# Patient Record
Sex: MALE | Race: BLACK OR AFRICAN AMERICAN | Employment: FULL TIME | URBAN - METROPOLITAN AREA
[De-identification: names, ages, dates, MRNs, and addresses within clinical notes are randomized per-mention and may not be internally consistent; named-entity substitution may affect disease eponyms.]

---

## 2020-04-26 ENCOUNTER — HOSPITAL ENCOUNTER (EMERGENCY)
Age: 45
Discharge: HOME | End: 2020-04-26
Attending: EMERGENCY MEDICINE | Admitting: EMERGENCY MEDICINE
Payer: COMMERCIAL

## 2020-04-26 ENCOUNTER — APPOINTMENT (OUTPATIENT)
Dept: GENERAL RADIOLOGY | Age: 45
End: 2020-04-26
Attending: EMERGENCY MEDICINE
Payer: COMMERCIAL

## 2020-04-26 VITALS
HEART RATE: 91 BPM | RESPIRATION RATE: 16 BRPM | BODY MASS INDEX: 33.18 KG/M2 | WEIGHT: 237 LBS | OXYGEN SATURATION: 97 % | DIASTOLIC BLOOD PRESSURE: 116 MMHG | SYSTOLIC BLOOD PRESSURE: 216 MMHG | TEMPERATURE: 99.7 F | HEIGHT: 71 IN

## 2020-04-26 DIAGNOSIS — M25.561 ACUTE PAIN OF RIGHT KNEE: Primary | ICD-10-CM

## 2020-04-26 PROCEDURE — 99282 EMERGENCY DEPT VISIT SF MDM: CPT

## 2020-04-26 PROCEDURE — 73564 X-RAY EXAM KNEE 4 OR MORE: CPT

## 2020-04-26 RX ORDER — NAPROXEN 500 MG/1
500 TABLET ORAL 2 TIMES DAILY WITH MEALS
Qty: 20 TAB | Refills: 0 | Status: SHIPPED | OUTPATIENT
Start: 2020-04-26 | End: 2020-11-10

## 2020-04-26 RX ORDER — KETOROLAC TROMETHAMINE 15 MG/ML
15 INJECTION, SOLUTION INTRAMUSCULAR; INTRAVENOUS
Status: DISCONTINUED | OUTPATIENT
Start: 2020-04-26 | End: 2020-04-26 | Stop reason: HOSPADM

## 2020-04-26 NOTE — DISCHARGE INSTRUCTIONS
Patient Education        Joint Pain: Care Instructions  Your Care Instructions    Many people have small aches and pains from overuse or injury to muscles and joints. Joint injuries often happen during sports or recreation, work tasks, or projects around the home. An overuse injury can happen when you put too much stress on a joint or when you do an activity that stresses the joint over and over, such as using the computer or rowing a boat. You can take action at home to help your muscles and joints get better. You should feel better in 1 to 2 weeks, but it can take 3 months or more to heal completely. Follow-up care is a key part of your treatment and safety. Be sure to make and go to all appointments, and call your doctor if you are having problems. It's also a good idea to know your test results and keep a list of the medicines you take. How can you care for yourself at home? · Do not put weight on the injured joint for at least a day or two. · For the first day or two after an injury, do not take hot showers or baths, and do not use hot packs. The heat could make swelling worse. · Put ice or a cold pack on the sore joint for 10 to 20 minutes at a time. Try to do this every 1 to 2 hours for the next 3 days (when you are awake) or until the swelling goes down. Put a thin cloth between the ice and your skin. · Wrap the injury in an elastic bandage. Do not wrap it too tightly because this can cause more swelling. · Prop up the sore joint on a pillow when you ice it or anytime you sit or lie down during the next 3 days. Try to keep it above the level of your heart. This will help reduce swelling. · Take an over-the-counter pain medicine, such as acetaminophen (Tylenol), ibuprofen (Advil, Motrin), or naproxen (Aleve). Read and follow all instructions on the label. · After 1 or 2 days of rest, begin moving the joint gently.  While the joint is still healing, you can begin to exercise using activities that do not strain or hurt the painful joint. When should you call for help? Call your doctor now or seek immediate medical care if:    · You have signs of infection, such as:  ? Increased pain, swelling, warmth, and redness. ? Red streaks leading from the joint. ? A fever.    Watch closely for changes in your health, and be sure to contact your doctor if:    · Your movement or symptoms are not getting better after 1 to 2 weeks of home treatment. Where can you learn more? Go to http://wm-nisha.info/  Enter P205 in the search box to learn more about \"Joint Pain: Care Instructions. \"  Current as of: June 26, 2019Content Version: 12.4  © 7194-9872 [employer]. Care instructions adapted under license by [employer] (which disclaims liability or warranty for this information). If you have questions about a medical condition or this instruction, always ask your healthcare professional. [place] [address] any warranty or liability for your use of this information. Patient Education        Learning About Joint Injections  What are joint injections? Joint injections are shots into a joint, such as the knee or shoulder. They are used to put in medicines, such as pain relievers and steroid medicines. Steroids can be injected directly into a swollen and painful joint to reduce inflammation. A steroid shot can sometimes help with short-term pain relief when other treatments haven't worked. If steroid shots help, pain may improve for weeks or months. How are they done? First, the area over the joint will be cleaned. Your doctor may then use a tiny needle to numb the skin in the area where you will get the joint injection. If a tiny needle is used to numb the area, your doctor will use another needle to inject the medicine. Your doctor may use a pain reliever, a steroid, or both. You may feel some pressure or discomfort.   The procedure takes 10 to 30 minutes. But the injection itself usually takes only a few minutes. Your doctor may put ice on the area before you go home. You will probably go home soon after your shot. What can you expect after a joint injection? You may have numbness around the joint for a few hours. If your shot included both a pain reliever and a steroid, then the pain will probably go away right away. But it might come back after a few hours. This might happen if the pain reliever wears off and the steroid hasn't started to work yet. Steroids don't always work. But when they do, the pain relief can last for several days to a few months or longer. Your doctor may tell you to use ice on the area. You can also use ice if the pain comes back. Put ice or a cold pack on your joint for 10 to 20 minutes at a time. Put a thin cloth between the ice and your skin. Follow your doctor's instructions carefully. Follow-up care is a key part of your treatment and safety. Be sure to make and go to all appointments, and call your doctor if you are having problems. It's also a good idea to know your test results and keep a list of the medicines you take. Where can you learn more? Go to http://wm-nisha.info/  Enter N961 in the search box to learn more about \"Learning About Joint Injections. \"  Current as of: June 26, 2019Content Version: 12.4  © 0362-8437 [employer]. Care instructions adapted under license by [employer] (which disclaims liability or warranty for this information). If you have questions about a medical condition or this instruction, always ask your healthcare professional. [address] any warranty or liability for your use of this information.

## 2020-04-26 NOTE — ED PROVIDER NOTES
EMERGENCY DEPARTMENT HISTORY AND PHYSICAL EXAM    5:18 PM      Date: 4/26/2020  Patient Name: Jaci Archibald    History of Presenting Illness     Chief Complaint   Patient presents with    Knee Pain         History Provided By: Patient    Additional History (Context): Jaci Archibald is a 38 y.o. male with hypertension and asthma who presents with right knee pain for 3 to 4 days. Patient denies trauma. Patient has history of arthritis. Patient has taken some Tylenol arthritis with no improvement. Patient smokes half pack per day. Admits to occasional alcohol use and denies any recreational drug use. Hans Shipley PCP: Heidi Myers MD      Current Facility-Administered Medications   Medication Dose Route Frequency Provider Last Rate Last Dose    ketorolac (TORADOL) injection 15 mg  15 mg IntraMUSCular NOW Dagoberto Escobedo, DO         Current Outpatient Medications   Medication Sig Dispense Refill    naproxen (NAPROSYN) 500 mg tablet Take 1 Tab by mouth two (2) times daily (with meals). 20 Tab 0    ANTI-ITCH, HC, 1 % topical cream   0    albuterol (PROVENTIL HFA, VENTOLIN HFA, PROAIR HFA) 90 mcg/actuation inhaler Take  by inhalation.  amLODIPine (NORVASC) 10 mg tablet Take 10 mg by mouth daily.  HYDROcodone-acetaminophen (NORCO) 5-325 mg per tablet Take 1 Tab by mouth every four (4) hours as needed for Pain. Max Daily Amount: 6 Tabs. 6 Tab 0       Past History     Past Medical History:  Past Medical History:   Diagnosis Date    Asthma     Genital warts     History of RPR test 2/26/2015    positive    Hypertension        Past Surgical History:  No past surgical history on file. Family History:  No family history on file.     Social History:  Social History     Tobacco Use    Smoking status: Current Every Day Smoker     Packs/day: 0.25   Substance Use Topics    Alcohol use: Yes     Comment: three to four times weekly    Drug use: No       Allergies:  No Known Allergies      Review of Systems Review of Systems   Constitutional: Negative. Negative for chills, diaphoresis and fever. HENT: Negative. Negative for congestion, rhinorrhea and sore throat. Eyes: Negative. Negative for pain, discharge and redness. Respiratory: Negative. Negative for cough, chest tightness, shortness of breath and wheezing. Cardiovascular: Negative. Negative for chest pain. Gastrointestinal: Negative. Negative for abdominal pain, constipation, diarrhea, nausea and vomiting. Genitourinary: Negative. Negative for dysuria, flank pain, frequency, hematuria and urgency. Musculoskeletal: Positive for arthralgias. Negative for back pain and neck pain. Skin: Negative. Negative for rash. Neurological: Negative. Negative for syncope, weakness, numbness and headaches. Psychiatric/Behavioral: Negative. All other systems reviewed and are negative. Physical Exam     Visit Vitals  BP (!) 216/116 (BP 1 Location: Left arm, BP Patient Position: At rest)   Pulse 91   Temp 99.7 °F (37.6 °C)   Resp 16   Ht 5' 11\" (1.803 m)   Wt 107.5 kg (237 lb)   SpO2 97%   BMI 33.05 kg/m²         Physical Exam  Constitutional:       General: He is not in acute distress. Appearance: Normal appearance. He is well-developed. He is not ill-appearing, toxic-appearing or diaphoretic. HENT:      Head: Normocephalic and atraumatic. Mouth/Throat:      Pharynx: No oropharyngeal exudate. Eyes:      General: No scleral icterus. Conjunctiva/sclera: Conjunctivae normal.      Pupils: Pupils are equal, round, and reactive to light. Neck:      Musculoskeletal: Normal range of motion and neck supple. Thyroid: No thyromegaly. Vascular: No hepatojugular reflux or JVD. Trachea: No tracheal deviation. Cardiovascular:      Rate and Rhythm: Normal rate and regular rhythm. Pulses: Normal pulses. Radial pulses are 2+ on the right side and 2+ on the left side.         Dorsalis pedis pulses are 2+ on the right side and 2+ on the left side. Heart sounds: Normal heart sounds, S1 normal and S2 normal. No murmur. No gallop. No S3 or S4 sounds. Pulmonary:      Effort: Pulmonary effort is normal. No respiratory distress. Breath sounds: Normal breath sounds. No decreased breath sounds, wheezing, rhonchi or rales. Abdominal:      General: Bowel sounds are normal. There is no distension. Palpations: Abdomen is soft. Abdomen is not rigid. There is no mass. Tenderness: There is no abdominal tenderness. There is no guarding or rebound. Negative signs include Cotton's sign and McBurney's sign. Musculoskeletal: Normal range of motion. Right knee: He exhibits normal range of motion, no swelling, no effusion, no ecchymosis, no deformity, no laceration, no erythema, normal alignment, no LCL laxity, normal patellar mobility, no bony tenderness, normal meniscus and no MCL laxity. Tenderness found. Medial joint line and lateral joint line tenderness noted. No MCL, no LCL and no patellar tendon tenderness noted. Legs:    Lymphadenopathy:      Head:      Right side of head: No submental, submandibular, preauricular or occipital adenopathy. Left side of head: No submental, submandibular, preauricular or occipital adenopathy. Cervical: No cervical adenopathy. Upper Body:      Right upper body: No supraclavicular adenopathy. Left upper body: No supraclavicular adenopathy. Skin:     General: Skin is warm and dry. Findings: No rash. Neurological:      Mental Status: He is alert. He is not disoriented. GCS: GCS eye subscore is 4. GCS verbal subscore is 5. GCS motor subscore is 6. Cranial Nerves: No cranial nerve deficit. Sensory: No sensory deficit. Coordination: Coordination normal.      Gait: Gait normal.      Deep Tendon Reflexes: Reflexes are normal and symmetric.       Comments: Grossly intact    Psychiatric:         Speech: Speech normal. Behavior: Behavior normal.         Thought Content: Thought content normal.         Judgment: Judgment normal.           Diagnostic Study Results     Labs -  No results found for this or any previous visit (from the past 12 hour(s)). Radiologic Studies -   XR KNEE RT MIN 4 V    (Results Pending)         Medical Decision Making   Provider Notes (Medical Decision Making):  MDM  Number of Diagnoses or Management Options  Diagnosis management comments: Right knee pain   Arthritis       I am the first provider for this patient. I reviewed the vital signs, available nursing notes, past medical history, past surgical history, family history and social history. Vital Signs-Reviewed the patient's vital signs. Records Reviewed: Nursing Notes (Time of Review: 5:18 PM)    ED Course: Progress Notes, Reevaluation, and Consults:    X-Ray knee showed No acute process. 5:48 PM 4/26/2020        Diagnosis       I have reassessed the patient. Patient is feeling well. Patient will be prescribed Naproxen. Patient was discharged in stable condition. Patient is to return to emergency department if any new or worsening condition. Clinical Impression:   1. Acute pain of right knee        Disposition: Discharged home     Follow-up Information     Follow up With Specialties Details Why Contact Info    Emerson Kunz MD Internal Medicine In 2 days  [address] [place] [address]      DAMIAN King In 2 days  [address] [place] [address]  [phone]             Attestation        Provider Attestation:     I personally performed the services described in the documentation, reviewed the documentation and it accurately and completely records my words and actions utilizing the [address] April 26, 2020 at 6:18 PM - [place], [address]. It is dictated using utilizing voice recognition software.   Unfortunately this leads to occasional typographical errors. I apologize in advance if the situation occurs. If questions arise please do not hesitate to contact me or call our department.

## 2020-04-26 NOTE — ED TRIAGE NOTES
Pt presents with right knee pain for 3 weeks that \"started with a limp and got worse\". He \"pulls pallets\" for work.

## 2020-04-26 NOTE — LETTER
NOTIFICATION RETURN TO WORK / SCHOOL 
 
4/26/2020 5:45 PM 
 
Mr. Syeda Ledbetter [address] 
[address] To Whom It May Concern: 
 
Syeda Ledbetter is currently under the care of [facility]. He will return to work/school on: 4/27/2020 If there are questions or concerns please have the patient contact our office. Sincerely, 
 
 
 
Dr. Carmen Oviedo. Juju Mcwilliams

## 2020-07-27 ENCOUNTER — HOSPITAL ENCOUNTER (OUTPATIENT)
Age: 45
Discharge: HOME | End: 2020-07-27
Attending: ORTHOPAEDIC SURGERY
Payer: COMMERCIAL

## 2020-07-27 DIAGNOSIS — M87.9 ACUTE OSTEONECROSIS (HCC): ICD-10-CM

## 2020-07-27 DIAGNOSIS — M17.11 OSTEOARTHRITIS OF RIGHT KNEE: ICD-10-CM

## 2020-07-27 DIAGNOSIS — S83.206D TEAR OF MENISCUS OF RIGHT KNEE, SUBSEQUENT ENCOUNTER: ICD-10-CM

## 2020-07-27 PROCEDURE — 73721 MRI JNT OF LWR EXTRE W/O DYE: CPT

## 2020-12-09 PROBLEM — M17.11 LOCALIZED OSTEOARTHRITIS OF RIGHT KNEE: Status: ACTIVE | Noted: 2020-12-09

## 2021-01-06 ENCOUNTER — HOSPITAL ENCOUNTER (OUTPATIENT)
Dept: PHYSICAL THERAPY | Age: 46
Discharge: HOME | End: 2021-01-06
Payer: COMMERCIAL

## 2021-01-06 PROCEDURE — 97530 THERAPEUTIC ACTIVITIES: CPT

## 2021-01-06 PROCEDURE — 97161 PT EVAL LOW COMPLEX 20 MIN: CPT

## 2021-01-06 PROCEDURE — 97016 VASOPNEUMATIC DEVICE THERAPY: CPT

## 2021-01-06 NOTE — PROGRESS NOTES
PT DAILY TREATMENT NOTE/KNEE EVAL     Patient Name: Дмитрий Plaza  Date:2021  : 1975  [x]  Patient  Verified  Payor: BLUE CROSS / Plan: [address] [place] [address] / Product Type: PPO /    In time: 1:06  Out time: 1:45  Total Treatment Time (min): 39  Visit #: 1 of     Medicare/[facility] Only   Total Timed Codes (min):  15 1:1 Treatment Time:  25     Treatment Area: No admission diagnoses are documented for this encounter. 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 Flex Eubanks is a 53 y/o, M pt with CC of Right knee pain. Pt is s/p PAMELA partial knee replacement on 2020. Pt reports cont to experience severe pain with standing/amb and mod swelling with Right knee and lower leg. Previous Treatment/Compliance:   PMHx/Surgical Hx:   Work Hx:  Living Situation:   Pt Goals: \"I want this pain to go away and I want my normal walk back\"  Barriers: []pain []financial []time []transportation []other  Motivation:   Substance use: []Alcohol []Tobacco []other:   FABQ Score: []low []elevate  Cognition: A & O x     Other:    OBJECTIVE/EXAMINATION  Domestic Life:  Activity/Recreational Limitations:   Mobility:   Self Care:          Modality rationale: decrease edema, decrease inflammation and decrease pain to improve the patients ability to amb with more ease   Min Type Additional Details    [] Estim:  []Unatt       []IFC  []Premod                        []Other:  []w/ice   []w/heat  Position:  Location:    [] Estim: []Att    []TENS instruct  []NMES                    []Other:  []w/US   []w/ice   []w/heat  Position:  Location:    []  Traction: [] Cervical       []Lumbar                       [] Prone          []Supine                       []Intermittent   []Continuous Lbs:  [] before manual  [] after manual    []  Ultrasound: []Continuous   [] Pulsed                           []1MHz   []3MHz Location:  W/cm2:    []  Iontophoresis with dexamethasone         Location: [] Take home patch   [] In clinic    []  Ice     []  heat  []  Ice massage  []  Laser   []  Anodyne Position:  Location:    []  Laser with stim  []  Other: Position:  Location:   10 [x]  Vasopneumatic Device Pressure:       [x] lo [] med [] hi   Temperature: [x] lo [] med [] hi   [x] Skin assessment post-treatment:  [x]intact []redness- no adverse reaction    []redness  adverse reaction:     15 min [x]Eval                  []Re-Eval       14 min Therapeutic Activity:  []  See flow sheet :Pt edu within scope of practice on prognosis, POC, modalities use, positioning, compression stock and elevation to improve swelling, pain management, HEP review, AD use.      Rationale: increase ROM, increase strength, improve coordination, improve balance and increase proprioception  to improve the patients ability to amb with more ease and safety       With   [] TE   [] TA   [] neuro   [] other: Patient Education: [x] Review HEP    [] Progressed/Changed HEP based on:   [] positioning   [] body mechanics   [] transfers   [] heat/ice application    [] other:      Other Objective/Functional Measures:     Physical Therapy Evaluation - Knee    Posture: [] Varus    [] Valgus    [] Recurvatum        [] Tibial Torsion    [] Foot Supination    [] Foot Pronation    Describe:    Gait:  [] Normal    [] Abnormal    [x] Antalgic    [] NWB    Device: SPC    Describe: Ambulating with SPC, gait balance deemed good minus, gait pattern was characterized with antalgic gait, locking his Right knee, and decreased ankle rockers.  Pt would benefit from skilled     ROM / Strength  [] Unable to assess                  AROM                      PROM                   Strength (1-5)    Left Right Left Right Left Right   Hip Flexion     5 5    Extension     3+ 3+    Abduction     4 4    Adduction         Knee Flexion 120 111   4+ 5    Extension +2 -3   5 5   Ankle Plantarflexion     ~4 ~4    Dorsiflexion     4+ 4=       Flexibility: [] Unable to assess at this time  Hamstrings:    (L) Tightness= [] WNL   [] Min   [] Mod   [] Severe    (R) Tightness= [] WNL   [x] Min   [] Mod   [] Severe  Quadriceps:    (L) Tightness= [] WNL   [] Min   [] Mod   [] Severe    (R) Tightness= [] WNL   [] Min   [x] Mod   [] Severe  Gastroc:      (L) Tightness= [] WNL   [] Min   [x] Mod   [] Severe    (R) Tightness= [] WNL   [] Min   [] Mod   [x] Severe  Other:    Palpation:   Neg/Pos  Neg/Pos  Neg/Pos   Joint Line x Quad tendon  Patellar ligament    Patella x Fibular head  Pes Anserinus x   Tibial tubercle x Hamstring tendons  Infrapatellar fat pad      Optional Tests:  Patellar Positioning (Static)   []L []R Normal []L []R Lateral   []L []R Sherian Andressa      []L []R Medial   []L []R Baja    Patellar Tracking   []L []R Glide (Lat)   []L []R Tilt (Lat)     []L []R Glide (Med)  []L []R Tilt (Med)      []L []R Tile (Inf)     Patellar Mobility   []L []R Hypermobile []L [x]R Hypomobile         Other tests/comments:       Pain Level (0-10 scale) post treatment: 7/10    ASSESSMENT/Changes in Function: see POC    Patient will continue to benefit from skilled PT services to modify and progress therapeutic interventions, address functional mobility deficits, address ROM deficits, address strength deficits, analyze and address soft tissue restrictions, analyze and cue movement patterns, analyze and modify body mechanics/ergonomics, assess and modify postural abnormalities, address imbalance/dizziness and instruct in home and community integration to attain remaining goals.      [x]  See Plan of Care  []  See progress note/recertification  []  See Discharge Summary         Progress towards goals / Updated goals:  See POC    PLAN  [x]  Upgrade activities as tolerated     [x]  Continue plan of care  []  Update interventions per flow sheet       []  Discharge due to:_  []  Other:_    Tiana Lugo, PT 1/6/2021  12:50 PM

## 2021-01-06 NOTE — PROGRESS NOTES
[facility] [employer] [facility]  KEISHA  [address] [place] [address]  [phone] fax    Plan of Care/ Statement of Necessity for Physical Therapy Services    Patient name: Elora Eisenmenger Start of Care: 2021   Referral source: Joce Delgadillo, * : 1975    Medical Diagnosis: No admission diagnoses are documented for this encounter. 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 Assessment/ mcnally information: Mr. Robert Nathan is a 55 y/o, M pt with CC of Right knee pain. Pt is s/p PAMELA partial knee replacement on 2020. Pt reports cont to experience severe pain with standing/amb and mod swelling with Right knee and lower leg. Pt present with min decreased AROM, min decreased strength of deep hips, decreased mobility of Left patella, and decreased SL stability. Ambulating with SPC, gait balance deemed good minus, gait pattern was characterized with antalgic gait, locking his Right knee, and decreased ankle rockers. Pt would benefit from skilled PT to address these deficits above to improve the ability to amb comfortably and safely.     Evaluation Complexity History MEDIUM  Complexity : 1-2 comorbidities / personal factors will impact the outcome/ POC ; Examination MEDIUM Complexity : 3 Standardized tests and measures addressing body structure, function, activity limitation and / or participation in recreation  ;Presentation LOW Complexity : Stable, uncomplicated  ;Clinical Decision Making MEDIUM Complexity : FOTO score of 26-74  Overall Complexity Rating: LOW   Problem List: pain affecting function, decrease ROM, decrease strength, edema affecting function, impaired gait/ balance, decrease ADL/ functional abilitiies, decrease activity tolerance, decrease flexibility/ joint mobility and decrease transfer abilities   Treatment Plan may include any combination of the following: Therapeutic exercise, Therapeutic activities, Neuromuscular re-education, Physical agent/modality, Gait/balance training, Manual therapy, Patient education, Self Care training, Functional mobility training, Home safety training and Stair training  Patient / Family readiness to learn indicated by: asking questions, trying to perform skills and interest  Persons(s) to be included in education: patient (P)  Barriers to Learning/Limitations: None  Patient Goal (s): I want this pain to go away and I want my normal walk back  Patient Self Reported Health Status: excellent  Rehabilitation Potential: good    Short term goals: To be accomplished within 1 week  1. Pt will be independent with HEP to maintain progression. Eval status: good understanding     Long term goals: To be accomplished within 6 weeks  1. Pt will improve FOTO score by TBd points to TBD/100 to show improvement with functional mobility performance. Eval status: TBD     2. Pt will have Right knee AROM improved to -3 to 120 degrees at least to amb household and community with normal and safe pattern. Eval status: 0-111 degrees     3. Pt will be able to lift 40# box with good body mechanics and no increase of pain so he can return to work safely. Eval status: unable at this time    4. Pt will be able community distance with no AD to improve ease and safety with ADLs and job duties. Eval status: amb with SPC     5. Pt will report at no more than 4-5/10 pain at worst to improve his QOL.   Eval status: 10/10 constant pain    Frequency / Duration: Patient to be seen 2 times per week for 6 weeks. Patient/ CarPatient/ Caregiver education and instruction: Diagnosis, prognosis, self care, activity modification, brace/ splint application and exercises   [x]  Plan of care has been reviewed with REMBERTO Fernandez, PT 1/6/2021 12:51 PM    ________________________________________________________________________    I certify that the above Therapy Services are being furnished while the patient is under my care. I agree with the treatment plan and certify that this therapy is necessary.     Physician's Signature:____________Date:_________TIME:________    ** Signature, Date and Time must be completed for valid certification **    Please sign and return to [facility] [employer] [facility]  [address] [place] [address]  [phone] fax

## 2021-01-08 ENCOUNTER — HOSPITAL ENCOUNTER (OUTPATIENT)
Dept: PHYSICAL THERAPY | Age: 46
Discharge: HOME | End: 2021-01-08
Payer: COMMERCIAL

## 2021-01-08 PROCEDURE — 97140 MANUAL THERAPY 1/> REGIONS: CPT

## 2021-01-08 PROCEDURE — 97530 THERAPEUTIC ACTIVITIES: CPT

## 2021-01-08 PROCEDURE — 97110 THERAPEUTIC EXERCISES: CPT

## 2021-01-08 NOTE — PROGRESS NOTES
PT DAILY TREATMENT NOTE     Patient Name: Lisa Mendez  Date:2021  : 1975  [x]  Patient  Verified  Payor: Siklu Stanwood / Plan: [address] [place] [address] / Product Type: PPO /    In time: 1:30  Out time: 2:36  Total Treatment Time (min): 66  Visit #: 2 of 12    Medicare/BCBS Only   Total Timed Codes (min):  51 1:1 Treatment Time: 51       Treatment Area: Right knee pain [M25.561]    SUBJECTIVE  Pain Level (0-10 scale): 6/10  Any medication changes, allergies to medications, adverse drug reactions, diagnosis change, or new procedure performed?: [x] No    [] Yes (see summary sheet for update)  Subjective functional status/changes:   [] No changes reported  Pt reports to go back to work he needs to be able to lift/carry up to 50#. 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 He wants to get back walking right and not leaning when he walks. He has been out of work since . He goes back to the [employer] on .      OBJECTIVE    Modality rationale: decrease inflammation and decrease pain to improve the patients ability to improve ease of ambulation   Min Type Additional Details    [] Estim:  []Unatt       []IFC  []Premod                        []Other:  []w/ice   []w/heat  Position:  Location:    [] Estim: []Att    []TENS instruct  []NMES                    []Other:  []w/US   []w/ice   []w/heat  Position:  Location:    []  Traction: [] Cervical       []Lumbar                       [] Prone          []Supine                       []Intermittent   []Continuous Lbs:  [] before manual  [] after manual    []  Ultrasound: []Continuous   [] Pulsed                           []1MHz   []3MHz W/cm2:  Location:    []  Iontophoresis with dexamethasone         Location: [] Take home patch   [] In clinic    []  Ice     []  heat  []  Ice massage  []  Laser   []  Anodyne Position:  Location:    []  Laser with stim  []  Other:  Position:  Location:   15 NC [x]  Vasopneumatic Device-no pressure Pressure:       [] lo [] med [] hi   Temperature: [x] lo [] med [] hi   [x] Skin assessment post-treatment:  [x]intact []redness- no adverse reaction    []redness  adverse reaction:     16 min Therapeutic Exercise:  [x] See flow sheet :   Rationale: increase ROM, increase strength, improve coordination, improve balance and increase proprioception to improve the patients ability to ambulate and transfer    25 min Therapeutic Activity:  [x]  See flow sheet : sit to stands; fwd/backward stepping for quad loading   Rationale: increase ROM, increase strength, improve coordination, improve balance and increase proprioception  to improve the patients ability to ambulate and transfer     10 min Manual Therapy:  Left upslip corrected with leg lengthening; right AI with MET: shotgun technique   The manual therapy interventions were performed at a separate and distinct time from the therapeutic activities interventions. Rationale: decrease pain, increase ROM and increase tissue extensibility to improve ease of ambulation and transfer       With   [] TE   [] TA   [] neuro   [] other: Patient Education: [x] Review HEP    [] Progressed/Changed HEP based on:   [] positioning   [] body mechanics   [] transfers   [] heat/ice application    [] other:      Other Objective/Functional Measures:   Right compensated trendelenburg during gait  Improved gait post pelvic correction  Educated with SB on firing glutes in SLS to reduce right lateral trunk lean  Worked on forward/backward stepping for left quad loading  Challenged with hip abduction  Added TB for sit to stands and verbal cues to improve eccentric control     Pain Level (0-10 scale) post treatment: 0/10    ASSESSMENT/Changes in Function: Initiated exercise program and pt highly motivated to get back to [facility]. He has a compensated right trendelenburg gait during ambulation without SPC.  He needs to be able to lift/carry up to 50# for work and push/pull heavy pallets for work at [employer]. Skilled PT is medically necessary to progress right LE strength and balance for return to ambulation without AD and to return to work duties without increased right knee pain. Patient will continue to benefit from skilled PT services to modify and progress therapeutic interventions, address functional mobility deficits, address ROM deficits, address strength deficits, analyze and address soft tissue restrictions, analyze and cue movement patterns, analyze and modify body mechanics/ergonomics, assess and modify postural abnormalities, address imbalance/dizziness and instruct in home and community integration to attain remaining goals. [x]  See Plan of Care  []  See progress note/recertification  []  See Discharge Summary         Progress towards goals / Updated goals:  Short term goals: To be accomplished within 1 week  1. Pt will be independent with HEP to maintain progression. Eval status: good understanding   Long term goals: To be accomplished within 6 weeks  1. Pt will improve FOTO score by TBd points to TBD/100 to show improvement with functional mobility performance. Eval status: TBD   2. Pt will have Right knee AROM improved to -3 to 120 degrees at least to amb household and community with normal and safe pattern. Eval status: 0-111 degrees   3. Pt will be able to lift 40# box with good body mechanics and no increase of pain so he can return to work safely. Eval status: unable at this time  4. Pt will be able community distance with no AD to improve ease and safety with ADLs and job duties. Eval status: amb with SPC   5. Pt will report at no more than 4-5/10 pain at worst to improve his QOL.   Eval status: 10/10 constant pain    PLAN  [x]  Upgrade activities as tolerated     [x]  Continue plan of care  []  Update interventions per flow sheet       []  Discharge due to:_  []  Other:_      Ghislaine Angulo, REMBERTO 1/8/2021  10:00 AM    Future Appointments   Date Time Provider Jaycee Sydney   1/8/2021  1:30 PM Arthuro Bye, PTA [facility]   1/11/2021  3:00 PM Arthuro Bye, PTA [facility]   1/14/2021  3:00 PM Katya Mosqueda, PT [facility]   1/18/2021 12:45 PM Arthuro Byann, PTA [facility]   1/21/2021  3:00 PM Katya Mosqueda, PT [facility]   1/25/2021  3:00 PM Naomi Dutta [facility]   1/28/2021  3:00 PM Katya Mosqueda, PT [facility]   2/1/2021  3:00 PM Arthaure Byann, PTA [facility]   2/4/2021  1:30 PM Katya Mosqueda, PT [facility]

## 2021-01-11 ENCOUNTER — HOSPITAL ENCOUNTER (OUTPATIENT)
Dept: PHYSICAL THERAPY | Age: 46
Discharge: HOME | End: 2021-01-11
Payer: COMMERCIAL

## 2021-01-11 PROCEDURE — 97110 THERAPEUTIC EXERCISES: CPT

## 2021-01-11 PROCEDURE — 97112 NEUROMUSCULAR REEDUCATION: CPT

## 2021-01-11 NOTE — PROGRESS NOTES
PT DAILY TREATMENT NOTE     Patient Name: Maria Elena Sparks  Date:2021  : 1975  [x]  Patient  Verified  Payor: BLUE CROSS / Plan: [address] [place] [address] / Product Type: PPO /    In time: 3:00   Out time: 4:00  Total Treatment Time (min): 60  Visit #: 3 of 12    Medicare/BCBS Only   Total Timed Codes (min): 45 1:1 Treatment Time:45       Treatment Area: Right knee pain [M25.561]    SUBJECTIVE  Pain Level (0-10 scale): 5/10  Any medication changes, allergies to medications, adverse drug reactions, diagnosis change, or new procedure performed?: [x] No    [] Yes (see summary sheet for update)  Subjective functional status/changes:   [] No changes reported  Pt reports his pain is generally lessening and he is wearing his compression garment today which is helping. He is able to go up the stairs step over step now. He is looking forward to more strengthening so he can get off the cane and get back to work.        OBJECTIVE    Modality rationale: decrease inflammation and decrease pain to improve the patients ability to improve ease of ambulation   Min Type Additional Details    [] Estim:  []Unatt       []IFC  []Premod                        []Other:  []w/ice   []w/heat  Position:  Location:    [] Estim: []Att    []TENS instruct  []NMES                    []Other:  []w/US   []w/ice   []w/heat  Position:  Location:    []  Traction: [] Cervical       []Lumbar                       [] Prone          []Supine                       []Intermittent   []Continuous Lbs:  [] before manual  [] after manual    []  Ultrasound: []Continuous   [] Pulsed                           []1MHz   []3MHz W/cm2:  Location:    []  Iontophoresis with dexamethasone         Location: [] Take home patch   [] In clinic    []  Ice     []  heat  []  Ice massage  []  Laser   []  Anodyne Position:  Location:    []  Laser with stim  []  Other:  Position:  Location:   15 NC [x]  Vasopneumatic Device-no pressure Pressure:       [] lo [] med [] hi   Temperature: [x] lo [] med [] hi   [x] Skin assessment post-treatment:  [x]intact []redness- no adverse reaction    []redness  adverse reaction:     30 min Therapeutic Exercise:  [x] See flow sheet :   Rationale: increase ROM, increase strength, improve coordination, improve balance and increase proprioception to improve the patients ability to ambulate and transfer    10 min Neuromuscular Re-education:  [x] See flow sheet : forward backward stepping for right quad loading; reverse TKE to reduce knee locking in extension; SLS balance with reduced right lateral trunk lean   Rationale: increase ROM, increase strength, improve coordination, improve balance and increase proprioception to improve the patients ability to ambulate and transfer     5 min Manual Therapy:  Left upslip corrected with leg lengthening; right AI with MET: shotgun technique   The manual therapy interventions were performed at a separate and distinct time from the therapeutic activities interventions. Rationale: decrease pain, increase ROM and increase tissue extensibility to improve ease of ambulation and transfer       With   [] TE   [] TA   [] neuro   [] other: Patient Education: [x] Review HEP    [] Progressed/Changed HEP based on:   [] positioning   [] body mechanics   [] transfers   [] heat/ice application    [] other:      Other Objective/Functional Measures:   Continues with pelvic obliquity; genu varus left>right so unsure if there may be a LLD contributing to ongoing upslip  Right compensated trendelenburg during SLS and trial of ambulation without AD  In standing, pt locks B knees in extension; educated on working on \"soft\" knees to reduce buckling and strengthen TKE  Progressing with sit to stands without UEs from low surface    Pain Level (0-10 scale) post treatment: 0/10    ASSESSMENT/Changes in Function: Pt progressing well towards initial goals in therapy.  He is able to perform sit to stands without UE assist and negotiate stairs with reciprocal pattern. He does compensate for decreased TKE strength in standing by locking B knees which could be continuing to cause the buckling sensation with stop/start walking. He does have a compensated trendelenburg gait from right weakness when ambulating without AD. He has a left upslip consistently so unsure if there is a slight LLD due to left knee varus deformity. 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 Patient will continue to benefit from skilled PT services to modify and progress therapeutic interventions, address functional mobility deficits, address ROM deficits, address strength deficits, analyze and address soft tissue restrictions, analyze and cue movement patterns, analyze and modify body mechanics/ergonomics, assess and modify postural abnormalities, address imbalance/dizziness and instruct in home and community integration to attain remaining goals. [x]  See Plan of Care  []  See progress note/recertification  []  See Discharge Summary         Progress towards goals / Updated goals:  Short term goals: To be accomplished within 1 week  1. Pt will be independent with HEP to maintain progression. Eval status: good understanding   MET  Long term goals: To be accomplished within 6 weeks  1. Pt will improve FOTO score by TBd points to TBD/100 to show improvement with functional mobility performance. Eval status: TBD   2. Pt will have Right knee AROM improved to -3 to 120 degrees at least to amb household and community with normal and safe pattern. Eval status: 0-111 degrees   3. Pt will be able to lift 40# box with good body mechanics and no increase of pain so he can return to work safely. Eval status: unable at this time  4.  Pt will be able community distance with no AD to improve ease and safety with ADLs and job duties. Eval status: amb with SPC   5. Pt will report at no more than 4-5/10 pain at worst to improve his QOL.   Eval status: 10/10 constant pain  Progressing 5/10 today    PLAN  [x]  Upgrade activities as tolerated     [x]  Continue plan of care  []  Update interventions per flow sheet       []  Discharge due to:_  []  Other:_      Paige Rhoades, REMBERTO 1/11/2021  10:00 AM    Future Appointments   Date Time Provider Jaycee Grimes   1/11/2021  3:00 PM Manuela Holland, PTA [facility]   1/14/2021  3:00 PM Katya Pereira, PT [facility]   1/18/2021 12:45 PM Manuela Holland, PTA [facility]   1/21/2021  3:00 PM Katya Pereira, PT [facility]   1/25/2021  3:00 PM Manuela Holland, PTA [facility]   1/28/2021  3:00 PM Katya Pereira, PT [facility]   2/1/2021  3:00 PM Manuela Holland, PTA [facility]   2/4/2021  1:30 PM Katya Pereira, PT [facility]

## 2021-01-14 ENCOUNTER — HOSPITAL ENCOUNTER (OUTPATIENT)
Dept: PHYSICAL THERAPY | Age: 46
Discharge: HOME | End: 2021-01-14
Payer: COMMERCIAL

## 2021-01-14 PROCEDURE — 97112 NEUROMUSCULAR REEDUCATION: CPT

## 2021-01-14 PROCEDURE — 97110 THERAPEUTIC EXERCISES: CPT

## 2021-01-14 NOTE — PROGRESS NOTES
PT DAILY TREATMENT NOTE     Patient Name: Gama Peterson  Date:2021  : 1975  [x]  Patient  Verified  Payor: BLUE CROSS / Plan: [address] [place] [address] / Product Type: PPO /    In time:3:00P  Out time:3:54P  Total Treatment Time (min): 47  Visit #: 4 of 12    Medicare/BCBS Only   Total Timed Codes (min):39 1:1 Treatment Time:  39       Treatment Area: Right knee pain [M25.561]    SUBJECTIVE  Pain Level (0-10 scale): 10  Any medication changes, allergies to medications, adverse drug reactions, diagnosis change, or new procedure performed?: [x] No    [] Yes (see summary sheet for update)  Subjective functional status/changes:   [] No changes reported    Patient reports he feels ok today. His pain is getting better. He felt great after last therapy session.     OBJECTIVE    Modality rationale: decrease inflammation and decrease pain to improve the patients ability to perform functional tasks with increased ease   Min Type Additional Details    [] Estim:  []Unatt       []IFC  []Premod                        []Other:  []w/ice   []w/heat  Position:  Location:    [] Estim: []Att    []TENS instruct  []NMES                    []Other:  []w/US   []w/ice   []w/heat  Position:  Location:    []  Traction: [] Cervical       []Lumbar                       [] Prone          []Supine                       []Intermittent   []Continuous Lbs:  [] before manual  [] after manual    []  Ultrasound: []Continuous   [] Pulsed                           []1MHz   []3MHz W/cm2:  Location:    []  Iontophoresis with dexamethasone         Location: [] Take home patch   [] In clinic    []  Ice     []  heat  []  Ice massage  []  Laser   []  Anodyne Position:  Location:    []  Laser with stim  []  Other:  Position:  Location:   15 (with setup) [x]  Vasopneumatic Device Pressure:       [] lo [] med [] hi no pressure   Temperature: [x] lo [] med [] hi   [x] Skin assessment post-treatment:  [x]intact []redness- no adverse reaction    []redness  adverse reaction:       27 min Therapeutic Exercise:  [x] See flow sheet :   Rationale: increase ROM, increase strength, improve coordination, improve balance and increase proprioception to improve the patients ability to perform functional tasks and ambulate with increased ease       12 min Neuromuscular Re-education:  [x]? See flow sheet : forward backward stepping; reverse TKE, sit to stands with TB around knees                 With   [] TE   [] TA   [] neuro   [] other: Patient Education: [x] Review HEP    [] Progressed/Changed HEP based on:   [] positioning   [] body mechanics   [x] transfers   [] heat/ice application    [] other:      Other Objective/Functional Measures:     Challenged with sidelying hip abd  Limited locking of knees into extension with activity  Mild decrease in knee pain at end of session    Pain Level (0-10 scale) post treatment: 3/10    ASSESSMENT/Changes in Function: Patient educated on safety with sit<>stand transfer and pushing up to standing with UE not using SPC. He continues to report intermittent knee buckling; he has not had any falls but feels he may when the knee evan. Patient will continue to benefit from skilled PT services to modify and progress therapeutic interventions, address functional mobility deficits, address ROM deficits, address strength deficits, analyze and address soft tissue restrictions, analyze and cue movement patterns, analyze and modify body mechanics/ergonomics, assess and modify postural abnormalities, address imbalance/dizziness and instruct in home and community integration to attain remaining goals. Progress towards goals / Updated goals:  Short term goals: To be accomplished within 1 week  1. Pt will be independent with HEP to maintain progression. Eval status: good understanding   MET  Long term goals: To be accomplished within 6 weeks  1.  Pt will improve FOTO score by TBd points to TBD/100 to show improvement with functional mobility performance. Eval status: TBD   2. Pt will have Right knee AROM improved to -3 to 120 degrees at least to amb household and community with normal and safe pattern. Eval status: 0-111 degrees   3. Pt will be able to lift 40# box with good body mechanics and no increase of pain so he can return to work safely. Eval status: unable at this time  4. Pt will be able community distance with no AD to improve ease and safety with ADLs and job duties. Eval status: amb with SPC   5. Pt will report at no more than 4-5/10 pain at worst to improve his QOL.   Eval status: 10/10 constant pain  Progressing 5/10 today    PLAN  [x]  Upgrade activities as tolerated     [x]  Continue plan of care  []  Update interventions per flow sheet       []  Discharge due to:_  []  Other:_      Maynor Junior, PT 1/14/2021  3:02 PM    Future Appointments   Date Time Provider Jaycee Grimes   1/18/2021 12:45 PM Calumet Lias, PTA [facility]   1/21/2021  3:00 PM Katya Marilyn Sox, PT [facility]   1/25/2021  3:00 PM Calumet Lias, PTA [facility]   1/28/2021  3:00 PM Katya Marilyn Sox, PT [facility]   2/1/2021  3:00 PM Calumet Lias, PTA [facility]   2/4/2021  1:30 PM Katya Amrilyn Sox, PT [facility]

## 2021-01-18 ENCOUNTER — HOSPITAL ENCOUNTER (OUTPATIENT)
Dept: PHYSICAL THERAPY | Age: 46
Discharge: HOME | End: 2021-01-18
Payer: COMMERCIAL

## 2021-01-18 PROCEDURE — 97530 THERAPEUTIC ACTIVITIES: CPT

## 2021-01-18 PROCEDURE — 97110 THERAPEUTIC EXERCISES: CPT

## 2021-01-18 NOTE — PROGRESS NOTES
PT DAILY TREATMENT NOTE     Patient Name: Sean Gill  Date:2021  : 1975  [x]  Patient  Verified  Payor: [employer] [place] / Plan: [address] [place] [address] / Product Type: PPO /    In time: 12:45  Out time: 1:50  Total Treatment Time (min): 65  Visit #: 5 of 12    Medicare/BCBS Only   Total Timed Codes (min): 50 1:1 Treatment Time: 50       Treatment Area: Right knee pain [M25.561]    SUBJECTIVE  Pain Level (0-10 scale): 3/10  Any medication changes, allergies to medications, adverse drug reactions, diagnosis change, or new procedure performed?: [x] No    [] Yes (see summary sheet for update)  Subjective functional status/changes:   [] No changes reported  Pt reports the MD appt got moved to . He states he is walking some around the house without he cane with less of a lean. He reports less buckling sensations in his right knee. He is out of pain medication so just using tylenol now. He is icing as needed and wearing his compression garment.       OBJECTIVE    Modality rationale: decrease inflammation and decrease pain to improve the patients ability to perform functional tasks with increased ease   Min Type Additional Details    [] Estim:  []Unatt       []IFC  []Premod                        []Other:  []w/ice   []w/heat  Position:  Location:    [] Estim: []Att    []TENS instruct  []NMES                    []Other:  []w/US   []w/ice   []w/heat  Position:  Location:    []  Traction: [] Cervical       []Lumbar                       [] Prone          []Supine                       []Intermittent   []Continuous Lbs:  [] before manual  [] after manual    []  Ultrasound: []Continuous   [] Pulsed                           []1MHz   []3MHz W/cm2:  Location:    []  Iontophoresis with dexamethasone         Location: [] Take home patch   [] In clinic    []  Ice     []  heat  []  Ice massage  []  Laser   []  Anodyne Position:  Location:    []  Laser with stim  []  Other:  Position:  Location:   15 (with setup) [x]  Vasopneumatic Device- no pressure no charge due to insurance Pressure:       [] lo [] med [] hi no pressure   Temperature: [x] lo [] med [] hi   [x] Skin assessment post-treatment:  [x]intact []redness- no adverse reaction    []redness  adverse reaction:       35 min Therapeutic Exercise:  [x] See flow sheet :   Rationale: increase ROM, increase strength, improve coordination, improve balance and increase proprioception to improve the patients ability to perform functional tasks and ambulate with increased ease       15 min Therapeutic Activity: squats with box lifts; push/pull with long bar on khadra to simulate pallets push/pulls   To increase functional strength for return to work activities with decreased pain          With   [] TE   [] TA   [] neuro   [] other: Patient Education: [x] Review HEP    [] Progressed/Changed HEP based on:   [] positioning   [] body mechanics   [x] transfers   [] heat/ice application    [] other:      Other Objective/Functional Measures:   Quad fatigue with TKE on machine and with push/pull simulation  Verbal cues to not use screw home mechanism for stability with pushing/pulling of the bar  Verbal cues for squat to keep weight shift in heels and increase knee flexion to reduce l/s compensation  Knee tightness with exercises and fatigue but no increased pain  Added prone quad stretch to reduce post exercise soreness/tightness  Added prone donkey kick for glute strengthening  Antalgic gait without SPC when ambulating around clinic; improving but still mild compensated trendelenburg on the right  Educated on working on standing/walking endurance to prepare for return to work when cleared by MD    Pain Level (0-10 scale) post treatment: 0/10     ASSESSMENT/Changes in Function: Pt is progressing in therapy with improving right knee strength and decreased pain. He struggles most with decreased TKE from compensation of locking knee in extension for stability.  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 He continues to need [facility] for ambulation to reduce antalgic and mild compensated trendelenburg gait on the right with fatigue. Will continue with right LE strength and balance for ease of ambulation, transfers, work duties with no AD and decreased pain. Patient will continue to benefit from skilled PT services to modify and progress therapeutic interventions, address functional mobility deficits, address ROM deficits, address strength deficits, analyze and address soft tissue restrictions, analyze and cue movement patterns, analyze and modify body mechanics/ergonomics, assess and modify postural abnormalities, address imbalance/dizziness and instruct in home and community integration to attain remaining goals. Progress towards goals / Updated goals:  Short term goals: To be accomplished within 1 week  1. Pt will be independent with HEP to maintain progression. Eval status: good understanding   MET  Long term goals: To be accomplished within 6 weeks  1. Pt will improve FOTO score by TBd points to TBD/100 to show improvement with functional mobility performance. Eval status: TBD   2. Pt will have Right knee AROM improved to -3 to 120 degrees at least to amb household and community with normal and safe pattern. Eval status: 0-111 degrees   3. Pt will be able to lift 40# box with good body mechanics and no increase of pain so he can return to work safely. Eval status: unable at this time  Initiated 15-20# box lifts with verbal cues for squat mechanics and using legs versus bending from waist (1/18/21)  4. Pt will be able community distance with no AD to improve ease and safety with ADLs and job duties. Eval status: amb with   Progressing with no SPC for household ambulation (1/18/21)  5. Pt will report at no more than 4-5/10 pain at worst to improve his QOL.   Eval status: 10/10 constant pain  Progressing 5/10 today  3/10 today without pain medication (1/18/21)    PLAN  [x]  Upgrade activities as tolerated     [x]  Continue plan of care  []  Update interventions per flow sheet       []  Discharge due to:_  []  Other:_      Jl Velez, PTA 1/18/2021  3:02 PM    Future Appointments   Date Time Provider Jaycee Grimes   1/18/2021 12:45 PM [facility], PTA [facility]   1/21/2021  3:00 PM Margaret Nolan, PTA [facility]   1/25/2021  3:00 PM [facility], PTA [facility]   1/28/2021  3:00 PM Katya Rivera, PT [facility]   2/1/2021  3:00 PM [facility], PTA [facility]   2/4/2021  1:30 PM Katya Rivera, PT [facility]

## 2021-01-21 ENCOUNTER — HOSPITAL ENCOUNTER (OUTPATIENT)
Dept: PHYSICAL THERAPY | Age: 46
Discharge: HOME | End: 2021-01-21
Payer: COMMERCIAL

## 2021-01-21 PROCEDURE — 97016 VASOPNEUMATIC DEVICE THERAPY: CPT

## 2021-01-21 PROCEDURE — 97110 THERAPEUTIC EXERCISES: CPT

## 2021-01-21 PROCEDURE — 97530 THERAPEUTIC ACTIVITIES: CPT

## 2021-01-21 NOTE — PROGRESS NOTES
PT DAILY TREATMENT NOTE     Patient Name: Parish Soto  Date:2021  : 1975  [x]  Patient  Verified  Payor: [employer] / Plan: [address] [place] [address] / Product Type: PPO /    In time: 3:07  Out time: 4:04  Total Treatment Time (min): 62  Visit #: 6 of 12    Medicare/BCBS Only   Total Timed Codes (min):  42 1:1 Treatment Time:  40       Treatment Area: Right knee pain [M25.561]    SUBJECTIVE  Pain Level (0-10 scale): 4/10  Any medication changes, allergies to medications, adverse drug reactions, diagnosis change, or new procedure performed?: [x] No    [] Yes (see summary sheet for update)  Subjective functional status/changes:   [] No changes reported  Pt will see MD tomorrow. He really hopes this will be over and he will return to work soon. He feels a little more pain without using his compression garment. Pt would like to cont PT as much as he can    OBJECTIVE    Modality rationale: decrease inflammation and decrease pain to improve the patients ability to improve pt's tolerance for ADLs. amb   Min Type Additional Details    [] Estim:  []Unatt       []IFC  []Premod                        []Other:  []w/ice   []w/heat  Position:  Location:    [] Estim: []Att    []TENS instruct  []NMES                    []Other:  []w/US   []w/ice   []w/heat  Position:  Location:    []  Traction: [] Cervical       []Lumbar                       [] Prone          []Supine                       []Intermittent   []Continuous Lbs:  [] before manual  [] after manual    []  Ultrasound: []Continuous   [] Pulsed                           []1MHz   []3MHz W/cm2:  Location:    []  Iontophoresis with dexamethasone         Location: [] Take home patch   [] In clinic    []  Ice     []  heat  []  Ice massage  []  Laser   []  Anodyne Position:  Location:    []  Laser with stim  []  Other:  Position:  Location:   15 [x]  Vasopneumatic Device Pressure:       [] lo [] med [] hi   Temperature: [x] lo [] med [] hi   [x] Skin assessment post-treatment:  [x]intact []redness- no adverse reaction    []redness  adverse reaction:     25 min Therapeutic Exercise:  [] See flow sheet :   Rationale: increase ROM and increase strength to improve the patients ability to amb and perform ADLs with more ease    15 min Therapeutic Activity:  [x]  See flow sheet :   Rationale: increase ROM, increase strength, improve coordination, improve balance and increase proprioception  to improve the patients ability to perform job duties with ease and safety           With   [] TE   [] TA   [] neuro   [] other: Patient Education: [x] Review HEP    [] Progressed/Changed HEP based on:   [] positioning   [] body mechanics   [] transfers   [] heat/ice application    [] other:      Other Objective/Functional Measures:    Pt was 7 min late    Compensate with hip hiking during s/l hip abd   Min LOB x1 with SLS; progressed to foam pad next visit      Min fatigued with increased weight and reps for box lifting, min cues to improve squatting form    Pain Level (0-10 scale) post treatment: 3/10    ASSESSMENT/Changes in Function: pt making steady progress with PT. He demonstrates improving form and tolerance to therex, push/pull simulation and lifting heavy box. Cont to progress resistance for therex and training to prepare pt to return to work. Patient will continue to benefit from skilled PT services to modify and progress therapeutic interventions, address functional mobility deficits, address ROM deficits, address strength deficits, analyze and address soft tissue restrictions, analyze and cue movement patterns, analyze and modify body mechanics/ergonomics, assess and modify postural abnormalities, address imbalance/dizziness and instruct in home and community integration to attain remaining goals.      [x]  See Plan of Care  []  See progress note/recertification  []  See Discharge Summary         Progress towards goals / Updated goals:  Short term goals: To be accomplished within 1 week  1. Pt will be independent with HEP to maintain progression. Eval status: good understanding   MET    Long term goals: To be accomplished within 6 weeks  1. Pt will improve FOTO score by 17 points to 76/100 to show improvement with functional mobility performance. Eval status: 59    2. Pt will have Right knee AROM improved to -3 to 120 degrees at least to amb household and community with normal and safe pattern. Eval status: 0-111 degrees     3. Pt will be able to lift 40# box with good body mechanics and no increase of pain so he can return to work safely. Eval status: unable at this time  Initiated 15-20# box lifts with verbal cues for squat mechanics and using legs versus bending from waist (1/18/21)    4. Pt will be able community distance with no AD to improve ease and safety with ADLs and job duties. Eval status: amb with   Progressing with no SPC for household ambulation (1/18/21)    5. Pt will report at no more than 4-5/10 pain at worst to improve his QOL.   Eval status: 10/10 constant pain  Progressing 5/10 today  3/10 today without pain medication (1/18/21)    PLAN  [x]  Upgrade activities as tolerated     [x]  Continue plan of care  []  Update interventions per flow sheet       []  Discharge due to:_  []  Other:_      Dillan Leblanc, RAINE 1/21/2021  7:33 AM    Future Appointments   Date Time Provider Jaycee Grimes   1/21/2021  3:00 PM Subhash Gonzales [id number]   1/25/2021  3:00 PM Carlos Enrique Vargas PTA [facility]   1/28/2021  3:00 PM Katya Borrero, PT [id number]   2/1/2021  3:00 PM Carlos Enrique Vargas PTA [facility]   2/4/2021  1:30 PM Katya Borrero, PT [facility]

## 2021-01-25 ENCOUNTER — HOSPITAL ENCOUNTER (OUTPATIENT)
Dept: PHYSICAL THERAPY | Age: 46
Discharge: HOME | End: 2021-01-25
Payer: COMMERCIAL

## 2021-01-25 PROCEDURE — 97110 THERAPEUTIC EXERCISES: CPT

## 2021-01-25 PROCEDURE — 97530 THERAPEUTIC ACTIVITIES: CPT

## 2021-01-25 NOTE — PROGRESS NOTES
PT DAILY TREATMENT NOTE     Patient Name: Loc Montano  Date:2021  : 1975  [x]  Patient  Verified  Payor: [employer] / [place]: [place] [employer] [place] / Product Type: PPO /    In time: 3:00  Out time: 4:18  Total Treatment Time (min): 78  Visit #: 7 of 12    Medicare/BCBS Only   Total Timed Codes (min): 63  1:1 Treatment Time:  58       Treatment Area: Right knee pain [M25.561]    SUBJECTIVE  Pain Level (0-10 scale): 4/10  Any medication changes, allergies to medications, adverse drug reactions, diagnosis change, or new procedure performed?: [x] No    [] Yes (see summary sheet for update)  Subjective functional status/changes:   [] No changes reported  Pt reports doctor said his walk was greatly improved. He told him to come back in March for a follow up and that they would get him back to work sooner if he felt ready. He states he doesn't feel ready for work yet and is still getting some inner right knee pain and calf pain.     OBJECTIVE    Modality rationale: decrease inflammation and decrease pain to improve the patient’s ability to improve pt's tolerance for ADLs.amb   Min Type Additional Details    [] Estim:  []Unatt       []IFC  []Premod                        []Other:  []w/ice   []w/heat  Position:  Location:    [] Estim: []Att    []TENS instruct  []NMES                    []Other:  []w/US   []w/ice   []w/heat  Position:  Location:    []  Traction: [] Cervical       []Lumbar                       [] Prone          []Supine                       []Intermittent   []Continuous Lbs:  [] before manual  [] after manual    []  Ultrasound: []Continuous   [] Pulsed                           []1MHz   []3MHz W/cm2:  Location:    []  Iontophoresis with dexamethasone         Location: [] Take home patch   [] In clinic    []  Ice     []  heat  []  Ice massage  []  Laser   []  Anodyne Position:  Location:    []  Laser with stim  []  Other:  Position:  Location:   15 [x]  Vasopneumatic Device- no  pressure Pressure:       [] lo [] med [] hi   Temperature: [x] lo [] med [] hi   [x] Skin assessment post-treatment:  [x]intact []redness- no adverse reaction    []redness  adverse reaction:     33 min Therapeutic Exercise:  [x] See flow sheet :   Rationale: increase ROM and increase strength to improve the patients ability to amb and perform ADLs with more ease    30 min Therapeutic Activity:  [x]  See flow sheet : review of orthotic use; review of hip strength   Rationale: increase ROM, increase strength, improve coordination, improve balance and increase proprioception  to improve the patients ability to perform job duties with ease and safety           With   [] TE   [] TA   [] neuro   [] other: Patient Education: [x] Review HEP    [] Progressed/Changed HEP based on:   [] positioning   [] body mechanics   [] transfers   [] heat/ice application    [] other:      Other Objective/Functional Measures:  Right compensated trendelenburg without cane  Right hip abduction 3+/5 with right QL pain performing  Right hip extension 3/5  Increased right hip flexor tightness and right calf tightness  Needs to work on midstance glute strengthening to get off of cane  Educated on orthotics from [facility]    Pain Level (0-10 scale) post treatment: 4/10     ASSESSMENT/Changes in Function: Pt overall progressing with decreased right knee pain. He has decreased right glute strength contributing to right compensated trendelenburg which is limiting his ability to ambulate without AD. He also has pes planus likely further contributing to medial knee pain. Will work to improve hip strength and arch support for improved knee alignment and decreased pain with return to work.     Patient will continue to benefit from skilled PT services to modify and progress therapeutic interventions, address functional mobility deficits, address ROM deficits, address strength deficits, analyze and address soft tissue restrictions, analyze and cue movement patterns, analyze and modify body mechanics/ergonomics, assess and modify postural abnormalities, address imbalance/dizziness and instruct in home and community integration to attain remaining goals. [x]  See Plan of Care  []  See progress note/recertification  []  See Discharge Summary         Progress towards goals / Updated goals:  Short term goals: To be accomplished within 1 week  1. Pt will be independent with HEP to maintain progression. Eval status: good understanding   MET    Long term goals: To be accomplished within 6 weeks  1. Pt will improve FOTO score by 17 points to 76/100 to show improvement with functional mobility performance. Eval status: 59    2. Pt will have Right knee AROM improved to -3 to 120 degrees at least to amb household and community with normal and safe pattern. Eval status: 0-111 degrees     3. Pt will be able to lift 40# box with good body mechanics and no increase of pain so he can return to work safely. Eval status: unable at this time  Initiated 15-20# box lifts with verbal cues for squat mechanics and using legs versus bending from waist (1/18/21)    4. Pt will be able community distance with no AD to improve ease and safety with ADLs and job duties. Eval status: amb with   Progressing with no SPC for household ambulation (1/18/21)    5. Pt will report at no more than 4-5/10 pain at worst to improve his QOL.   Eval status: 10/10 constant pain  Progressing 5/10 today  3/10 today without pain medication (1/18/21)    PLAN  [x]  Upgrade activities as tolerated     [x]  Continue plan of care  []  Update interventions per flow sheet       []  Discharge due to:_  []  Other:_      Juan Manuel Stewart PTA 1/25/2021  7:33 AM    Future Appointments   Date Time Provider Jaycee Grimes   1/25/2021  3:00 PM [facility]   1/28/2021  3:00 PM Katya Carroll, RAINE CERON [facility]   2/1/2021  3:00 PM Michael Coates PTA [facility]   2/4/2021  1:30 PM Katya Goodson, PT [facility]

## 2021-01-28 ENCOUNTER — HOSPITAL ENCOUNTER (OUTPATIENT)
Dept: PHYSICAL THERAPY | Age: 46
Discharge: HOME | End: 2021-01-28
Payer: COMMERCIAL

## 2021-01-28 PROCEDURE — 97110 THERAPEUTIC EXERCISES: CPT

## 2021-01-28 NOTE — PROGRESS NOTES
PT DAILY TREATMENT NOTE     Patient Name: Darron Wheat  Date:2021  : 1975  [x]  Patient  Verified  Payor: BLUE CROSS / Plan: [address] [place] [address] / Product Type: PPO /    In time:3:08P  Out time:4:10P  Total Treatment Time (min): 62  Visit #: 8 of 12    Medicare/BCBS Only   Total Timed Codes (min):  47 1:1 Treatment Time:  52       Treatment Area: Right knee pain [M25.561]    SUBJECTIVE  Pain Level (0-10 scale): 3/10  Any medication changes, allergies to medications, adverse drug reactions, diagnosis change, or new procedure performed?: [x] No    [] Yes (see summary sheet for update)  Subjective functional status/changes:   [] No changes reported    Patient reports he feels tired today. He stayed up late last night. He is wearing his stocking today and feels it gives him some support and helps with the swelling. He has not yet ordered the orthosis yet. He reports decreased frequency of knee buckling.      OBJECTIVE    Modality rationale: decrease inflammation and decrease pain to improve the patients ability to ambulate with increased ease   Min Type Additional Details    [] Estim:  []Unatt       []IFC  []Premod                        []Other:  []w/ice   []w/heat  Position:  Location:    [] Estim: []Att    []TENS instruct  []NMES                    []Other:  []w/US   []w/ice   []w/heat  Position:  Location:    []  Traction: [] Cervical       []Lumbar                       [] Prone          []Supine                       []Intermittent   []Continuous Lbs:  [] before manual  [] after manual    []  Ultrasound: []Continuous   [] Pulsed                           []1MHz   []3MHz W/cm2:  Location:    []  Iontophoresis with dexamethasone         Location: [] Take home patch   [] In clinic    []  Ice     []  heat  []  Ice massage  []  Laser   []  Anodyne Position:  Location:    []  Laser with stim  []  Other:  Position:  Location:   15 [x]  Vasopneumatic Device Pressure:       [] lo [] med [] hi no pressure  Temperature: [] lo [] med [] hi   [x] Skin assessment post-treatment:  [x]intact []redness- no adverse reaction    []redness  adverse reaction:     47 min Therapeutic Exercise:  [x] See flow sheet :   Rationale: increase ROM, increase strength, improve balance and increase proprioception to improve the patients ability to ambulate with increased ease              With   [] TE   [] TA   [] neuro   [] other: Patient Education: [x] Review HEP    [] Progressed/Changed HEP based on:   [] positioning   [] body mechanics   [] transfers   [] heat/ice application    [] other:      Other Objective/Functional Measures:     Right knee AROM: -1-118 deg  Patient reported challenge with MSR with right forward  Added hip abd with SB at wall with working foot forward to improve hip abd in stance phase of gait  Sharp pain in left hip with SB abd at wall in stance with right  Subjective reports of decreased difficulty with clamshells (right>left)      Pain Level (0-10 scale) post treatment: 3/10    ASSESSMENT/Changes in Function: Patient remains motivated to participate in therapy and return to work. He continues to demonstrate Trendelenburg gait when ambulating without AD. Mild increase in hip and knee symptoms with activities which was alleviated with rest. Positive subjective response to game ready at end of session however patient denied change in pain compared to start of session. Patient will continue to benefit from skilled PT services to modify and progress therapeutic interventions, address functional mobility deficits, address ROM deficits, address strength deficits, analyze and address soft tissue restrictions, analyze and cue movement patterns, analyze and modify body mechanics/ergonomics, assess and modify postural abnormalities, address imbalance/dizziness and instruct in home and community integration to attain remaining goals. Progress towards goals / Updated goals:  1.  Pt will be independent with HEP to maintain progression. Eval status: good understanding   MET     Long term goals: To be accomplished within 6 weeks  1. Pt will improve FOTO score by 17 points to 76/100 to show improvement with functional mobility performance. Eval status: 59     2. Pt will have Right knee AROM improved to -3 to 120 degrees at least to amb household and community with normal and safe pattern. Eval status: 0-111 degrees     Progressing: -1 to 118 deg (1/28/21)     3. Pt will be able to lift 40# box with good body mechanics and no increase of pain so he can return to work safely. Eval status: unable at this time  Initiated 15-20# box lifts with verbal cues for squat mechanics and using legs versus bending from waist (1/18/21)     4. Pt will be able community distance with no AD to improve ease and safety with ADLs and job duties. Eval status: amb with   Progressing with no SPC for household ambulation (1/18/21)     5. Pt will report at no more than 4-5/10 pain at worst to improve his QOL.   Eval status: 10/10 constant pain  Progressing 5/10 today  3/10 today without pain medication (1/18/21)    PLAN  [x]  Upgrade activities as tolerated     [x]  Continue plan of care  []  Update interventions per flow sheet       []  Discharge due to:_  []  Other:_      Susan Mckenzie PT 1/28/2021  2:05 PM    Future Appointments   Date Time Provider Jaycee Grimes   1/28/2021  3:00 PM Katya Frausto, [id number]   2/1/2021  3:00 PM Hugo Albert PTA [facility]   2/4/2021  1:30 PM Katya Frausto, PT [facility]

## 2021-02-01 ENCOUNTER — HOSPITAL ENCOUNTER (OUTPATIENT)
Dept: PHYSICAL THERAPY | Age: 46
Discharge: HOME | End: 2021-02-01
Payer: COMMERCIAL

## 2021-02-01 PROCEDURE — 97110 THERAPEUTIC EXERCISES: CPT

## 2021-02-01 NOTE — PROGRESS NOTES
PT DAILY TREATMENT NOTE     Patient Name: Lisa Mendez  Date:2021  : 1975  [x]  Patient  Verified  Payor: BLUE CROSS / Plan: [address] [place] [address] / Product Type: PPO /    In time: 3:08  Out time: 4:04  Total Treatment Time (min): 56  Visit #: 9 of 12    Medicare/BCBS Only   Total Timed Codes (min):  41 1:1 Treatment Time: 41        Treatment Area: Right knee pain [M25.561]    SUBJECTIVE  Pain Level (0-10 scale): 3/10   Any medication changes, allergies to medications, adverse drug reactions, diagnosis change, or new procedure performed?: [x] No    [] Yes (see summary sheet for update)  Subjective functional status/changes:   [] No changes reported  Pt reports some itching but less pain overall to his right knee. He states he thinks his limp is a habit from expecting pain but not truly a weakness now. He still has a hard time getting on his knees getting in bed. He feels he is getting stronger. He wants to do 3 times a week to build in preparation for return to work which he thinks will be March if the MD releases him.      OBJECTIVE    Modality rationale: decrease inflammation and decrease pain to improve the patients ability to ambulate with increased ease   Min Type Additional Details    [] Estim:  []Unatt       []IFC  []Premod                        []Other:  []w/ice   []w/heat  Position:  Location:    [] Estim: []Att    []TENS instruct  []NMES                    []Other:  []w/US   []w/ice   []w/heat  Position:  Location:    []  Traction: [] Cervical       []Lumbar                       [] Prone          []Supine                       []Intermittent   []Continuous Lbs:  [] before manual  [] after manual    []  Ultrasound: []Continuous   [] Pulsed                           []1MHz   []3MHz W/cm2:  Location:    []  Iontophoresis with dexamethasone         Location: [] Take home patch   [] In clinic    []  Ice     []  heat  []  Ice massage  []  Laser   []  Anodyne Position:  Location: []  Laser with stim  []  Other:  Position:  Location:   15 [x]  Vasopneumatic Device- no pressure Pressure:       [] lo [] med [] hi no pressure  Temperature: [] lo [] med [] hi   [x] Skin assessment post-treatment:  [x]intact []redness- no adverse reaction    []redness  adverse reaction:     41 min Therapeutic Exercise:  [x] See flow sheet :   Rationale: increase ROM, increase strength, improve balance and increase proprioception to improve the patients ability to ambulate with increased ease          With   [] TE   [] TA   [] neuro   [] other: Patient Education: [x] Review HEP    [] Progressed/Changed HEP based on:   [] positioning   [] body mechanics   [] transfers   [] heat/ice application    [] other:      Other Objective/Functional Measures:  Progressed leg press machine weight  Added khadra donkey kicks for strengthening  Challenged with hip abduction strengthening educated on hip depression first for better glute activation  Increased hip flexor tightness on the right  Added side planks for glute activation and hip thrusts from the mat table    Pain Level (0-10 scale) post treatment: 2/10    ASSESSMENT/Changes in Function: Pt progressing well with hip and knee strengthening. He is able to perform transfers, stairs and has improved gait. He still demonstrates some hip weakness but is progressing well. He ambulates with a limp when not focused from compensation prior to surgery. Will continue with work specific strengthening for stability in preparation for likely return in March. Will also progress dynamic gait without AD to improve stability and balance for decreased fall risk.      Patient will continue to benefit from skilled PT services to modify and progress therapeutic interventions, address functional mobility deficits, address ROM deficits, address strength deficits, analyze and address soft tissue restrictions, analyze and cue movement patterns, analyze and modify body mechanics/ergonomics, assess and modify postural abnormalities, address imbalance/dizziness and instruct in home and community integration to attain remaining goals. Progress towards goals / Updated goals:  1. Pt will be independent with HEP to maintain progression. Eval status: good understanding   MET     Long term goals: To be accomplished within 6 weeks  1. Pt will improve FOTO score by 17 points to 76/100 to show improvement with functional mobility performance. Eval status: 59   2. Pt will have Right knee AROM improved to -3 to 120 degrees at least to amb household and community with normal and safe pattern. Eval status: 0-111 degrees  Progressing: -1 to 118 deg (1/28/21)   3. Pt will be able to lift 40# box with good body mechanics and no increase of pain so he can return to work safely. Eval status: unable at this time  Initiated 15-20# box lifts with verbal cues for squat mechanics and using legs versus bending from waist (1/18/21)   4. Pt will be able community distance with no AD to improve ease and safety with ADLs and job duties. Eval status: amb with   Progressing with no SPC for household ambulation (1/18/21)   5. Pt will report at no more than 4-5/10 pain at worst to improve his QOL.   Eval status: 10/10 constant pain  Progressing 5/10 today  3/10 today without pain medication (1/18/21)    PLAN  [x]  Upgrade activities as tolerated     [x]  Continue plan of care  []  Update interventions per flow sheet       []  Discharge due to:_  []  Other:_      Cally Perkins, PTA 2/1/2021  2:05 PM    Future Appointments   Date Time Provider Jaycee Grimes   2/1/2021  3:00 PM Sreedhar Lockwood [facility]   2/4/2021  1:30 PM Katya Rojas, PT [facility]

## 2021-02-11 ENCOUNTER — HOSPITAL ENCOUNTER (OUTPATIENT)
Dept: PHYSICAL THERAPY | Age: 46
Discharge: HOME | End: 2021-02-11
Payer: COMMERCIAL

## 2021-02-11 PROCEDURE — 97110 THERAPEUTIC EXERCISES: CPT

## 2021-02-11 NOTE — PROGRESS NOTES
PT DAILY TREATMENT NOTE     Patient Name: Maria Elena Sparks  Date:2021  : 1975  [x]  Patient  Verified  Payor: BLUE CROSS / Plan: [address] [place] [address] / Product Type: PPO /    In time:3:12P  Out time:4:05P  Total Treatment Time (min): 48  Visit #: 1 of 12    Medicare/BCBS Only   Total Timed Codes (min):  38 1:1 Treatment Time:  38       Treatment Area: Right knee pain [M25.561]    SUBJECTIVE  Pain Level (0-10 scale): 2-3/10  Any medication changes, allergies to medications, adverse drug reactions, diagnosis change, or new procedure performed?: [x] No    [] Yes (see summary sheet for update)  Subjective functional status/changes:   [] No changes reported    Patient reports he is scheduled to return to work on 3/10. He returns to his MD on 3/9.      OBJECTIVE    Modality rationale: decrease inflammation and decrease pain to improve the patients ability to perform ADLs with increased ease   Min Type Additional Details    [] Estim:  []Unatt       []IFC  []Premod                        []Other:  []w/ice   []w/heat  Position:  Location:    [] Estim: []Att    []TENS instruct  []NMES                    []Other:  []w/US   []w/ice   []w/heat  Position:  Location:    []  Traction: [] Cervical       []Lumbar                       [] Prone          []Supine                       []Intermittent   []Continuous Lbs:  [] before manual  [] after manual    []  Ultrasound: []Continuous   [] Pulsed                           []1MHz   []3MHz W/cm2:  Location:    []  Iontophoresis with dexamethasone         Location: [] Take home patch   [] In clinic    []  Ice     []  heat  []  Ice massage  []  Laser   []  Anodyne Position:  Location:    []  Laser with stim  []  Other:  Position:  Location:   15 [x]  Vasopneumatic Device Pressure:       [] lo [] med [] hi no pressure  Temperature: [x] lo [] med [] hi   [x] Skin assessment post-treatment:  [x]intact []redness- no adverse reaction    []redness  adverse reaction: 38 min Therapeutic Exercise:  [x] See flow sheet :   Rationale: increase ROM, increase strength, improve coordination and increase proprioception to improve the patients ability to perform ADLs with increased ease              With   [x] TE   [] TA   [] neuro   [] other: Patient Education: [x] Review HEP    [] Progressed/Changed HEP based on:   [] positioning   [] body mechanics   [] transfers   [] heat/ice application    [x] other: scar massage, POC     Other Objective/Functional Measures:     Pain in left hip with Jose abd  Verbal cues for technique with hip ext at Jose   Added OTB with clamshells  Increased difficulty with leg press with increased ease with change in foot position on plate      Pain Level (0-10 scale) post treatment: 2/10    ASSESSMENT/Changes in Function: Patient continues to present with diminished hip/glute strength evidenced by Trendelenburg gait without AD and QL pain with sidelying hip abduction. He has left hip pain in left unilateral stance. Patient educated on importance of performing activities with good effort to improve strength and endurance in order to return to ambulating without AD and improve ease with essential work tasks. Patient will continue to benefit from skilled PT services to modify and progress therapeutic interventions, address functional mobility deficits, address ROM deficits, address strength deficits, analyze and address soft tissue restrictions, analyze and cue movement patterns, analyze and modify body mechanics/ergonomics, assess and modify postural abnormalities, address imbalance/dizziness and instruct in home and community integration to attain remaining goals. Progress towards goals / Updated goals:  1. Patient will improve FOTO score by 17 points to 76/100 to show improvement with functional mobility performance.   2. Patient will improve right knee flex AROM to at least 120 deg in order to ambulate in household and community with normal and safe gait pattern. 3. Patient will be able to lift at least 40# box with good body mechanics and no increase of pain so he can return to work safely. 4. Patient will be able community distance with no AD to improve ease and safety with ADLs and job duties.     PLAN  []  Upgrade activities as tolerated     []  Continue plan of care  []  Update interventions per flow sheet       []  Discharge due to:_  []  Other:_      Maynor Junior PT 2/11/2021  3:12 PM    Future Appointments   Date Time Provider Jaycee Grimes   2/15/2021  3:00 PM Antwon Islas, PTA [facility]

## 2021-02-15 ENCOUNTER — HOSPITAL ENCOUNTER (OUTPATIENT)
Dept: PHYSICAL THERAPY | Age: 46
Discharge: HOME | End: 2021-02-15
Payer: COMMERCIAL

## 2021-02-15 PROCEDURE — 97110 THERAPEUTIC EXERCISES: CPT

## 2021-02-15 PROCEDURE — 97530 THERAPEUTIC ACTIVITIES: CPT

## 2021-02-15 NOTE — PROGRESS NOTES
PT DAILY TREATMENT NOTE     Patient Name: Vidhya Sprain  Date:2/15/2021  : 1975  [x]  Patient  Verified  Payor: BLUE CROSS / Plan: [address] [place] [address] / Product Type: PPO /    In time: 3:05  Out time: 4:03  Total Treatment Time (min): 58  Visit #: 2 of 12    Medicare/BCBS Only   Total Timed Codes (min): 43 1:1 Treatment Time: 43        Treatment Area: Right knee pain [M25.561]    SUBJECTIVE  Pain Level (0-10 scale): 2/10  Any medication changes, allergies to medications, adverse drug reactions, diagnosis change, or new procedure performed?: [x] No    [] Yes (see summary sheet for update)  Subjective functional status/changes:   [] No changes reported  Pt reports he may need to stop therapy sooner because he found out he now has a $20 coinsurance to pay and without working he doesn't have money to cover that. He may see if he can go back to work sooner but is going to talk with his doctor and insurance to see the plan. He still feels some heat to the right knee at times and wants to know when it will feel normal. 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     OBJECTIVE    Modality rationale: decrease inflammation and decrease pain to improve the patients ability to perform ADLs with increased ease   Min Type Additional Details    [] Estim:  []Unatt       []IFC  []Premod                        []Other:  []w/ice   []w/heat  Position:  Location:    [] Estim: []Att    []TENS instruct  []NMES                    []Other:  []w/US   []w/ice   []w/heat  Position:  Location:    []  Traction: [] Cervical       []Lumbar                       [] Prone          []Supine                       []Intermittent   []Continuous Lbs:  [] before manual  [] after manual    []  Ultrasound: []Continuous   [] Pulsed                           []1MHz []3MHz W/cm2:  Location:    []  Iontophoresis with dexamethasone         Location: [] Take home patch   [] In clinic    []  Ice     []  heat  []  Ice massage  []  Laser   []  Anodyne Position:  Location:    []  Laser with stim  []  Other:  Position:  Location:   15 [x]  Vasopneumatic Device- no pressure Pressure:       [] lo [] med [] hi no pressure  Temperature: [x] lo [] med [] hi   [x] Skin assessment post-treatment:  [x]intact []redness- no adverse reaction    []redness  adverse reaction:         28 min Therapeutic Exercise:  [x] See flow sheet :   Rationale: increase ROM, increase strength, improve coordination and increase proprioception to improve the patients ability to perform ADLs with increased ease    15 min Therapeutic Activity:  [x] See flow sheet : heel lift 1/4 inch to left shoe to address LLD and improve gait   Rationale: increase ROM, increase strength, improve coordination and increase proprioception to improve the patients ability to perform ADLs with increased ease          With   [x] TE   [] TA   [] neuro   [] other: Patient Education: [x] Review HEP    [] Progressed/Changed HEP based on:   [] positioning   [] body mechanics   [] transfers   [] heat/ice application    [x] other: scar massage, POC     Other Objective/Functional Measures:   Much improved gait post heel lift addition to left shoe  Continues with decreased B adductor strength of the hips  Educated on getting ankle weights for strengthening at home  Discussed if pt needs to D/C that we would update his program prior to D/C to ensure self progression of strengthening for return to work duties    Pain Level (0-10 scale) post treatment: 0/10    ASSESSMENT/Changes in Function: Pt progressing with decreased pain and decreased use of his SPC. He continued to have a limp during ambulation but was found to have a LLD left shorter than  Right that improved significantly with addition of a heel lift.  I suspect this will continue to help decrease the pain, swelling and impact of the right LE during ambulation. Will await pt decision on continuation versus D/C from therapy due to coinsurance and likely plan to provide an updated HEP for independent strengthening. Patient will continue to benefit from skilled PT services to modify and progress therapeutic interventions, address functional mobility deficits, address ROM deficits, address strength deficits, analyze and address soft tissue restrictions, analyze and cue movement patterns, analyze and modify body mechanics/ergonomics, assess and modify postural abnormalities, address imbalance/dizziness and instruct in home and community integration to attain remaining goals. Progress towards goals / Updated goals:  1. Patient will improve FOTO score by 17 points to 76/100 to show improvement with functional mobility performance. 2. Patient will improve right knee flex AROM to at least 120 deg in order to ambulate in household and community with normal and safe gait pattern. 3. Patient will be able to lift at least 40# box with good body mechanics and no increase of pain so he can return to work safely. 4. Patient will be able community distance with no AD to improve ease and safety with ADLs and job duties.     PLAN  [x]  Upgrade activities as tolerated     [x]  Continue plan of care  []  Update interventions per flow sheet       []  Discharge due to:_  []  Other:_      Ircarolina Staplest, PTA 2/15/2021  3:12 PM    Future Appointments   Date Time Provider Jaycee Grimes   2/15/2021  3:00 PM Justin Silvestre, PTA [facility]   2/17/2021  3:00 PM Patrice Ramos [id number]   2/19/2021  3:00 PM Justin Silvestre, PTA [facility]   2/22/2021 11:15 AM Justin Mediate, PTA [facility]   2/24/2021  3:00 PM Katya Mahoney, PT [facility]   2/26/2021  3:00 PM Justin Silvestre, PTA [facility]   3/1/2021  3:00 PM Justin Silvestre, PTA [facility]   3/4/2021  3:00 PM Justin Silvestre, PTA [id number]   3/5/2021  3:00 PM Katya Burgess, PT [facility]

## 2021-02-17 ENCOUNTER — HOSPITAL ENCOUNTER (OUTPATIENT)
Dept: PHYSICAL THERAPY | Age: 46
Discharge: HOME | End: 2021-02-17
Payer: COMMERCIAL

## 2021-02-17 PROCEDURE — 97110 THERAPEUTIC EXERCISES: CPT

## 2021-02-17 NOTE — PROGRESS NOTES
PT DAILY TREATMENT NOTE     Patient Name: Katie Friend  Date:2021  : 1975  [x]  Patient  Verified  Payor: BLUE CROSS / Plan: [address] [place] [address] / Product Type: PPO /    In time: 3:03P  Out time:4:00P  Total Treatment Time (min): 62  Visit #: 3 of 12    Medicare/BCBS Only   Total Timed Codes (min):  42 1:1 Treatment Time:  42       Treatment Area: Right knee pain [M25.561]    SUBJECTIVE  Pain Level (0-10 scale): 2/10  Any medication changes, allergies to medications, adverse drug reactions, diagnosis change, or new procedure performed?: [x] No    [] Yes (see summary sheet for update)  Subjective functional status/changes:   [] No changes reported      Patient reports he has been wearing the heel lift continously and reports he has been feeling much better. He presents to therapy ambulating without SPC and has been walking without it at all times the last few days. Patient is planning to continue therapy at this time until he returns to work on 3/10. He will only begin one job for about a week and then look into return to second job after that.      OBJECTIVE    Modality rationale: decrease inflammation and decrease pain to improve the patients ability to perform functional tasks with increased ease   Min Type Additional Details    [] Estim:  []Unatt       []IFC  []Premod                        []Other:  []w/ice   []w/heat  Position:  Location:    [] Estim: []Att    []TENS instruct  []NMES                    []Other:  []w/US   []w/ice   []w/heat  Position:  Location:    []  Traction: [] Cervical       []Lumbar                       [] Prone          []Supine                       []Intermittent   []Continuous Lbs:  [] before manual  [] after manual    []  Ultrasound: []Continuous   [] Pulsed                           []1MHz   []3MHz W/cm2:  Location:    []  Iontophoresis with dexamethasone         Location: [] Take home patch   [] In clinic    []  Ice     []  heat  []  Ice massage  []  Laser   []  Anodyne Position:  Location:    []  Laser with stim  []  Other:  Position:  Location:   15 [x]  Vasopneumatic Device Pressure:       [] lo [] med [] hi no pressure   Temperature: [x] lo [] med [] hi   [x] Skin assessment post-treatment:  [x]intact []redness- no adverse reaction    []redness  adverse reaction:       42 min Therapeutic Exercise:  [x] See flow sheet :   Rationale: increase ROM, increase strength and improve coordination to improve the patients ability to perform functional tasks with increased ease              With   [] TE   [] TA   [] neuro   [] other: Patient Education: [x] Review HEP    [] Progressed/Changed HEP based on:   [] positioning   [] body mechanics   [] transfers   [] heat/ice application    [x] other:      Other Objective/Functional Measures:    Right knee flex AROM-122 deg   Left knee flex AROM-127 deg  Verbal and tactile cues for hip depression with sidelying hip abd  Hip adduction strength 2+ bilaterally  Added supine bridges with ball between knees and scissoring for adduction strength     Pain Level (0-10 scale) post treatment: 2/10    ASSESSMENT/Changes in Function: Patient continues to ambulate with mild Trendelenburg gait without SPC which is much improved since addition of heel lift at last visit. Adduction strength remains limited bilaterally; patient challenged with activities to promote adduction strength. Knee flex AROM continues to improve. Patient has reported pain levels no greater than 3/10 since beginning of the month.      Patient will continue to benefit from skilled PT services to modify and progress therapeutic interventions, address functional mobility deficits, address ROM deficits, address strength deficits, analyze and address soft tissue restrictions, analyze and cue movement patterns, analyze and modify body mechanics/ergonomics, assess and modify postural abnormalities, address imbalance/dizziness and instruct in home and community integration to attain remaining goals.       Progress towards goals / Updated goals:  1. Patient will improve FOTO score by 17 points to 76/100 to show improvement with functional mobility performance.  2. Patient will improve right knee flex AROM to at least 120 deg in order to ambulate in household and community with normal and safe gait pattern.  3. Patient will be able to lift at least 40# box with good body mechanics and no increase of pain so he can return to work safely.  4. Patient will be able community distance with no AD to improve ease and safety with ADLs and job duties.       PLAN  [x]  Upgrade activities as tolerated     [x]  Continue plan of care  []  Update interventions per flow sheet       []  Discharge due to:_  []  Other:_      Tess Vang PT 2/17/2021  2:10 PM    Future Appointments   Date Time Provider Department Center   2/17/2021  3:00 PM Tess Hayward, PT [facility]   2/19/2021  3:00 PM Behrens, Laura M, PTA [facility]   2/22/2021 11:15 AM Behrens, Laura M, PTA [facility]   2/24/2021  3:00 PM Tess Hayward, PT [facility]   2/26/2021  3:00 PM Behrens, Laura M, PTA [facility]   3/1/2021  3:00 PM Behrens, Laura M, PTA [facility]   3/4/2021  3:00 PM Behrens, Laura M, PTA [facility]   3/5/2021  3:00 PM Tess Hayward, PT [facility]

## 2021-02-19 ENCOUNTER — HOSPITAL ENCOUNTER (OUTPATIENT)
Dept: PHYSICAL THERAPY | Age: 46
Discharge: HOME | End: 2021-02-19
Payer: COMMERCIAL

## 2021-02-19 PROCEDURE — 97110 THERAPEUTIC EXERCISES: CPT

## 2021-02-19 NOTE — PROGRESS NOTES
PT DAILY TREATMENT NOTE     Patient Name: Richard Alamo  Date:2021  : 1975  [x]  Patient  Verified  Payor: BLUE CROSS / Plan: [address] [place] [address] / Product Type: PPO /    In time: 3:05  Out time: 4:03  Total Treatment Time (min): 58  Visit #: 4 of 12    Medicare/BCBS Only   Total Timed Codes (min): 43 1:1 Treatment Time: 43       Treatment Area: Right knee pain [M25.561]    SUBJECTIVE  Pain Level (0-10 scale): 2/10  Any medication changes, allergies to medications, adverse drug reactions, diagnosis change, or new procedure performed?: [x] No    [] Yes (see summary sheet for update)  Subjective functional status/changes:   [] No changes reported  Pt reports improved walking and less pain since adding the lift. He states still some swelling and heat when he first wakes up.      OBJECTIVE    Modality rationale: decrease inflammation and decrease pain to improve the patients ability to perform functional tasks with increased ease   Min Type Additional Details    [] Estim:  []Unatt       []IFC  []Premod                        []Other:  []w/ice   []w/heat  Position:  Location:    [] Estim: []Att    []TENS instruct  []NMES                    []Other:  []w/US   []w/ice   []w/heat  Position:  Location:    []  Traction: [] Cervical       []Lumbar                       [] Prone          []Supine                       []Intermittent   []Continuous Lbs:  [] before manual  [] after manual    []  Ultrasound: []Continuous   [] Pulsed                           []1MHz   []3MHz W/cm2:  Location:    []  Iontophoresis with dexamethasone         Location: [] Take home patch   [] In clinic    []  Ice     []  heat  []  Ice massage  []  Laser   []  Anodyne Position:  Location:    []  Laser with stim  []  Other:  Position:  Location:   15 [x]  Vasopneumatic Device-no pressure Pressure:       [] lo [] med [] hi no pressure   Temperature: [x] lo [] med [] hi   [x] Skin assessment post-treatment:  [x]intact []redness- no adverse reaction    []redness  adverse reaction:       43 min Therapeutic Exercise:  [x] See flow sheet :   Rationale: increase ROM, increase strength and improve coordination to improve the patients ability to perform functional tasks with increased ease              With   [] TE   [] TA   [] neuro   [] other: Patient Education: [x] Review HEP    [] Progressed/Changed HEP based on:   [] positioning   [] body mechanics   [] transfers   [] heat/ice application    [x] other:      Other Objective/Functional Measures:  Decreased right knee pain with MWM of tibial ER during midstance  Added hip IR/ER strengthening and tibial IR/ER strengthening  Progressed weights/resistance per flow sheet  No increased pain during session    Pain Level (0-10 scale) post treatment: 0/10    ASSESSMENT/Changes in Function: Pt progressing with improving strength and decreased pain. He continues to have a minor upslip on the left likely due to prolonged time without addressing the LLD on the left. He has some decrease in hip IR/ER strength and has less pain at midstance with tibial ER manually. Will continue to progress strength for reduced pain and improved ease of performing job duties. Patient will continue to benefit from skilled PT services to modify and progress therapeutic interventions, address functional mobility deficits, address ROM deficits, address strength deficits, analyze and address soft tissue restrictions, analyze and cue movement patterns, analyze and modify body mechanics/ergonomics, assess and modify postural abnormalities, address imbalance/dizziness and instruct in home and community integration to attain remaining goals. Progress towards goals / Updated goals:  1. Patient will improve FOTO score by 17 points to 76/100 to show improvement with functional mobility performance.   2. Patient will improve right knee flex AROM to at least 120 deg in order to ambulate in household and community with normal and safe gait pattern. 3. Patient will be able to lift at least 40# box with good body mechanics and no increase of pain so he can return to work safely.   4. Patient will be able community distance with no AD to improve ease and safety with ADLs and job duties.       PLAN  [x]  Upgrade activities as tolerated     [x]  Continue plan of care  []  Update interventions per flow sheet       []  Discharge due to:_  []  Other:_      Kellen Ortiz, REMBERTO 2/19/2021  2:10 PM    Future Appointments   Date Time Provider Jaycee Grimes   2/19/2021  3:00 PM Reyes Denise, PTA [facility]   2/22/2021 11:15 AM Reyes Denise, PTA [facility]   2/24/2021  3:00 PM Katya Earrubén Beulah, PT [facility]   2/26/2021  3:00 PM Reyes Denise, PTA [facility]   3/1/2021  3:00 PM Reyes Denise, PTA [facility]   3/4/2021  3:00 PM Reyes Denise, PTA [facility]   3/5/2021  3:00 PM Katya Earrubén Beulah, PT [facility]

## 2021-02-22 ENCOUNTER — HOSPITAL ENCOUNTER (OUTPATIENT)
Dept: PHYSICAL THERAPY | Age: 46
Discharge: HOME | End: 2021-02-22
Payer: COMMERCIAL

## 2021-02-22 PROCEDURE — 97110 THERAPEUTIC EXERCISES: CPT

## 2021-02-22 NOTE — PROGRESS NOTES
PT DAILY TREATMENT NOTE     Patient Name: Loc Montano  Date:2021  : 1975  [x]  Patient  Verified  Payor: BLUE CROSS / [place] / Product Type: PPO /    In time: 11:15  Out time: 12:14  Total Treatment Time (min): 59   Visit #: 5 of 12    Medicare/BCBS Only   Total Timed Codes (min): 44 1:1 Treatment Time:44       Treatment Area: Right knee pain [M25.561]    SUBJECTIVE  Pain Level (0-10 scale):2/10  Any medication changes, allergies to medications, adverse drug reactions, diagnosis change, or new procedure performed?: [x] No    [] Yes (see summary sheet for update)  Subjective functional status/changes:   [] No changes reported  Pt reports less pain and soreness overall. He didn't work on his exercises over the weekend. He feels his walk has improved overall. He is working on pushing his leg down like he was taught last session.        OBJECTIVE    Modality rationale: decrease inflammation and decrease pain to improve the patient’s ability to perform functional tasks with increased ease   Min Type Additional Details    [] Estim:  []Unatt       []IFC  []Premod                        []Other:  []w/ice   []w/heat  Position:  Location:    [] Estim: []Att    []TENS instruct  []NMES                    []Other:  []w/US   []w/ice   []w/heat  Position:  Location:    []  Traction: [] Cervical       []Lumbar                       [] Prone          []Supine                       []Intermittent   []Continuous Lbs:  [] before manual  [] after manual    []  Ultrasound: []Continuous   [] Pulsed                           []1MHz   []3MHz W/cm2:  Location:    []  Iontophoresis with dexamethasone         Location: [] Take home patch   [] In clinic    []  Ice     []  heat  []  Ice massage  []  Laser   []  Anodyne Position:  Location:    []  Laser with stim  []  Other:  Position:  Location:   15 [x]  Vasopneumatic Device-no pressure Pressure:       [] lo [] med [] hi no pressure  Temperature: [x] lo [] med [] hi   [x] Skin assessment post-treatment:  [x]intact []redness- no adverse reaction    []redness  adverse reaction:       44 min Therapeutic Exercise:  [x] See flow sheet :   Rationale: increase ROM, increase strength and improve coordination to improve the patients ability to perform functional tasks with increased ease          With   [] TE   [] TA   [] neuro   [] other: Patient Education: [x] Review HEP    [] Progressed/Changed HEP based on:   [] positioning   [] body mechanics   [] transfers   [] heat/ice application    [x] other:      Other Objective/Functional Measures:  Challenged with progression of exercises  Right knee AROM 1-126 degrees  No increased pain during session  Progress side planks next visit  No LOB with EO SLS but challenged with EC  Progressed reps/resistance    Pain Level (0-10 scale) post treatment: 0/10    ASSESSMENT/Changes in Function: Pt progressing well towards final goals in therapy. He has improving right knee AROM and strength. He is ambulating with improved gait and no AD since adding lift to left shoe for LLD. He will continue to benefit from further hip strengthening for right knee stability to reduce pain during gait. Patient will continue to benefit from skilled PT services to modify and progress therapeutic interventions, address functional mobility deficits, address ROM deficits, address strength deficits, analyze and address soft tissue restrictions, analyze and cue movement patterns, analyze and modify body mechanics/ergonomics, assess and modify postural abnormalities, address imbalance/dizziness and instruct in home and community integration to attain remaining goals. Progress towards goals / Updated goals:  1. Patient will improve FOTO score by 17 points to 76/100 to show improvement with functional mobility performance.   2. Patient will improve right knee flex AROM to at least 120 deg in order to ambulate in household and community with normal and safe gait pattern. 3. Patient will be able to lift at least 40# box with good body mechanics and no increase of pain so he can return to work safely.   4. Patient will be able community distance with no AD to improve ease and safety with ADLs and job duties.       PLAN  [x]  Upgrade activities as tolerated     [x]  Continue plan of care  []  Update interventions per flow sheet       []  Discharge due to:_  []  Other:_      Buddy Sood PTA 2/22/2021  2:10 PM    Future Appointments   Date Time Provider Jaycee Grimes   2/22/2021 11:15 AM Darral Flory, PTA [facility]   2/24/2021  3:00 PM Katya Leticia Valenciaers, PT [facility]   2/26/2021  3:00 PM Darral Flory, PTA [facility]   3/1/2021  3:00 PM Darral Flory, PTA [facility]   3/4/2021  3:00 PM Darral Flory, PTA [facility]   3/5/2021  3:00 PM Katya Mimiretta Spillers, PT [facility]

## 2021-02-26 ENCOUNTER — HOSPITAL ENCOUNTER (OUTPATIENT)
Dept: PHYSICAL THERAPY | Age: 46
Discharge: HOME | End: 2021-02-26
Payer: COMMERCIAL

## 2021-02-26 PROCEDURE — 97110 THERAPEUTIC EXERCISES: CPT

## 2021-02-26 NOTE — PROGRESS NOTES
PT DAILY TREATMENT NOTE     Patient Name: Darron Wheat  Date:2021  : 1975  [x]  Patient  Verified  Payor: BLUE CROSS / Plan: [address] [place] [address] / Product Type: PPO /    In time: 3:15   Out time: 3:45  Total Treatment Time (min): 30  Visit #: 6 of 12    Medicare/BCBS Only   Total Timed Codes (min): 30 1:1 Treatment Time: 30       Treatment Area: Right knee pain [M25.561]    SUBJECTIVE  Pain Level (0-10 scale): 2/10  Any medication changes, allergies to medications, adverse drug reactions, diagnosis change, or new procedure performed?: [x] No    [] Yes (see summary sheet for update)  Subjective functional status/changes:   [] No changes reported  Pt reports still some right knee pain when walking. He has been doing the push down of his left leg at home. He states next week will be his last week. OBJECTIVE    30 min Therapeutic Exercise:  [x] See flow sheet :   Rationale: increase ROM, increase strength and improve coordination to improve the patients ability to perform functional tasks with increased ease          With   [] TE   [] TA   [] neuro   [] other: Patient Education: [x] Review HEP    [] Progressed/Changed HEP based on:   [] positioning   [] body mechanics   [] transfers   [] heat/ice application    [x] other:      Other Objective/Functional Measures:  15 minutes late to session  Declined ice/heat at end  Educated on 10# ankle weights to get for home for LAQ/HSC for strengthening  Right knee 4+/5 extension and 4/5 flexion compared to left 5/5 for both  No increased pain during session    Pain Level (0-10 scale) post treatment: 0/10    ASSESSMENT/Changes in Function: Pt progressing towards final goals in therapy with improvement in right knee strength and AROM. He continues with some deficits in flexion/extension strength which may be causing final pain with weightbearing at midstance from instability.  Will continue for one final week to initiate a final HEP for continued independent progression of strength in preparation for D/C. Patient will continue to benefit from skilled PT services to modify and progress therapeutic interventions, address functional mobility deficits, address ROM deficits, address strength deficits, analyze and address soft tissue restrictions, analyze and cue movement patterns, analyze and modify body mechanics/ergonomics, assess and modify postural abnormalities, address imbalance/dizziness and instruct in home and community integration to attain remaining goals. Progress towards goals / Updated goals:  1. Patient will improve FOTO score by 17 points to 76/100 to show improvement with functional mobility performance. 2. Patient will improve right knee flex AROM to at least 120 deg in order to ambulate in household and community with normal and safe gait pattern. 3. Patient will be able to lift at least 40# box with good body mechanics and no increase of pain so he can return to work safely.   4. Patient will be able community distance with no AD to improve ease and safety with ADLs and job duties.       PLAN  [x]  Upgrade activities as tolerated     [x]  Continue plan of care  []  Update interventions per flow sheet       []  Discharge due to:_  []  Other:_      Loraine Lozoya PTA 2/26/2021  2:10 PM    Future Appointments   Date Time Provider Jaycee Girmes   2/26/2021  3:00 PM Esther Barrios, PTA [facility]   3/1/2021  3:00 PM Esther Barrios, PTA [facility]   3/4/2021  3:00 PM Esther Barrios, PTA [facility]   3/5/2021  3:00 PM Katya Helton, PT [facility]

## 2021-03-01 ENCOUNTER — HOSPITAL ENCOUNTER (OUTPATIENT)
Dept: PHYSICAL THERAPY | Age: 46
Discharge: HOME | End: 2021-03-01
Payer: COMMERCIAL

## 2021-03-01 PROCEDURE — 97110 THERAPEUTIC EXERCISES: CPT

## 2021-03-01 NOTE — PROGRESS NOTES
PT DAILY TREATMENT NOTE     Patient Name: Kathya Garnica  Date:3/1/2021  : 1975  [x]  Patient  Verified  Payor: BLUE CROSS / Plan: [address] [place] [address] / Product Type: PPO /    In time: 3:05  Out time:3:47  Total Treatment Time (min): 42  Visit #: 7 of 12    Medicare/BCBS Only   Total Timed Codes (min): 42   1:1 Treatment Time:  42       Treatment Area: Right knee pain [M25.561]    SUBJECTIVE  Pain Level (0-10 scale): 2/10  Any medication changes, allergies to medications, adverse drug reactions, diagnosis change, or new procedure performed?: [x] No    [] Yes (see summary sheet for update)  Subjective functional status/changes:   [] No changes reported  Patient states he is still having pain in the inside of his knee that only comes when he is moving around. Patient reports he has no functional limitations. OBJECTIVE      42 min Therapeutic Exercise:  [] See flow sheet :   Rationale: increase ROM and increase strength to improve the patients ability to carry out ADLs            With   [x] TE   [] TA   [] neuro   [] other: Patient Education: [x] Review HEP    [] Progressed/Changed HEP based on:   [] positioning   [] body mechanics   [] transfers   [] heat/ice application    [] other:      Other Objective/Functional Measures:   Patient performed exercises well  Added leg extension machine exercise  Pt performed 40# box lifts with minor verbal cueing    Pain Level (0-10 scale) post treatment: 1/10    ASSESSMENT/Changes in Function:   Patient is progressing to final goals in therapy with being able to perform heavy box lifts using proper body mechanics. He continues to have pain in his medial knee due to weakness of his quads/hamstrings. Patient is educated on how to further address these deficits at home, as he initiates a final HEP for continued progression of strength after D/C.     Patient will continue to benefit from skilled PT services to modify and progress therapeutic interventions, address functional mobility deficits, address ROM deficits, address strength deficits, analyze and address soft tissue restrictions, analyze and cue movement patterns, analyze and modify body mechanics/ergonomics, assess and modify postural abnormalities and address imbalance/dizziness to attain remaining goals. Progress towards goals / Updated goals:  1. Patient will improve FOTO score by 17 points to 76/100 to show improvement with functional mobility performance. 2. Patient will improve right knee flex AROM to at least 120 deg in order to ambulate in household and community with normal and safe gait pattern. 3. Patient will be able to lift at least 40# box with good body mechanics and no increase of pain so he can return to work safely.  MET (3/1/21)  4. Patient will be able community distance with no AD to improve ease and safety with ADLs and job duties    PLAN  []  Upgrade activities as tolerated     [x]  Continue plan of care  []  Update interventions per flow sheet       []  Discharge due to:_  []  Other:_      Warren Erwin 3/1/2021  3:04 PM    Future Appointments   Date Time Provider Jaycee Grimes   3/4/2021  3:00 PM Desmond Blake PTA [facility]   3/5/2021  3:00 PM Katya Long PT [facility]

## 2021-03-04 ENCOUNTER — HOSPITAL ENCOUNTER (OUTPATIENT)
Dept: PHYSICAL THERAPY | Age: 46
Discharge: HOME | End: 2021-03-04
Payer: COMMERCIAL

## 2021-03-04 PROCEDURE — 97110 THERAPEUTIC EXERCISES: CPT

## 2021-03-04 NOTE — PROGRESS NOTES
PT DAILY TREATMENT NOTE     Patient Name: Lisbet Braga  Date:3/4/2021  : 1975  [x]  Patient  Verified  Payor: BLUE CROSS / Plan: [address] [place] [address] / Product Type: PPO /    In time:3:05  Out time:3:59  Total Treatment Time (min): 47  Visit #: 8 of 12    Medicare/BCBS Only   Total Timed Codes (min):  54 1:1 Treatment Time:  43       Treatment Area: Right knee pain [M25.561]    SUBJECTIVE  Pain Level (0-10 scale): 2/10  Any medication changes, allergies to medications, adverse drug reactions, diagnosis change, or new procedure performed?: [x] No    [] Yes (see summary sheet for update)  Subjective functional status/changes:   [] No changes reported  Pt reports that he still has medial knee pain with walking immediately. Otherwise, he is no loner having pain. Next MD 3/9/21. He is ready for DC tomorrow. Pt reports that he is doing modified planks and side planks and hip abduction with TB for current HEP. He is going to go to [address] [place] [address] to buy some ankle weights so he can do the LAQ at home.       OBJECTIVE      54 min Therapeutic Exercise:  [x] See flow sheet :   Rationale: increase ROM and increase strength to improve the patients ability to improve ambulatory tolerance and allow for continued progression independently following DC             With   [] TE   [] TA   [] neuro   [] other: Patient Education: [x] Review HEP    [] Progressed/Changed HEP based on:   [] positioning   [] body mechanics   [] transfers   [] heat/ice application    [x] other: reviewed expected healing time for surgery, advised beginning a walking program and progress time as tolerated, reviewed updated HEP interventions technique and purpose       Other Objective/Functional Measures:     Gave and reviewed updated HEP  Pt challenged with current program   Challenged with SL bridges but able to complete with correct form  Attempted sidelying hip adduction with 5# initially but pt performed with decreased height and compensation, reduced weight to 3# and pt was able to perform with correct form    Right knee AROM: 127*  Left knee AROM: 128*    No increased pain following session     Pain Level (0-10 scale) post treatment: 2/10    ASSESSMENT/Changes in Function:     Pt is making steady progress in therapy, meeting 2/4 assessed updated goals. He is no longer ambulating with AD but has not attempted walking longer distances. Educated patient regarding importance of a walking program and progress time as tolerated. He continues to demonstrate decreased hip strength and is challenged with current program.  Pt was given updated HEP to address remaining deficits. He will benefit from 1 final session to ensure understanding/compliance with final HEP in order to allow patient to maintain functional gains achieved with therapy and allow for continued progression independently following DC. DC next session as pt has met or is progressing towards therapy goals and skilled intervention will no longer be required. Patient will continue to benefit from skilled PT services to modify and progress therapeutic interventions, address functional mobility deficits, address ROM deficits, address strength deficits, analyze and address soft tissue restrictions, analyze and cue movement patterns, analyze and modify body mechanics/ergonomics, assess and modify postural abnormalities and instruct in home and community integration to attain remaining goals. Progress towards goals / Updated goals:  1. Patient will improve FOTO score by 17 points to 76/100 to show improvement with functional mobility performance. 2. Patient will improve right knee flex AROM to at least 120 deg in order to ambulate in household and community with normal and safe gait pattern. Goal met. 127* 3/4/21  3. Patient will be able to lift at least 40# box with good body mechanics and no increase of pain so he can return to work safely.  MET (3/1/21)  4. Patient will be able community distance with no AD to improve ease and safety with ADLs and job duties Progressing.   No AD but pt has not attempted long distances 3/4/21     PLAN  []  Upgrade activities as tolerated     [x]  Continue plan of care  []  Update interventions per flow sheet       []  Discharge due to:_  [x]  Other: DC next visit       Warren Waite, PT 3/4/2021  3:16 PM    Future Appointments   Date Time Provider Jaycee Grimes   3/5/2021  3:00 PM Katya Dowling, PT [facility]

## 2021-03-05 ENCOUNTER — HOSPITAL ENCOUNTER (OUTPATIENT)
Dept: PHYSICAL THERAPY | Age: 46
Discharge: HOME | End: 2021-03-05
Payer: COMMERCIAL

## 2021-03-05 PROCEDURE — 97530 THERAPEUTIC ACTIVITIES: CPT

## 2021-03-05 PROCEDURE — 97110 THERAPEUTIC EXERCISES: CPT

## 2021-03-05 NOTE — PROGRESS NOTES
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 Status at last note/certification: [id number]=33  Status at discharge: NOT MET-FOTO=72    Goal: Patient will improve right knee flex AROM to at least 120 deg in order to ambulate in household and community with normal and safe gait pattern. Status at last note/certification: Progressing-right knee flex 119 deg  Status at discharge: MET-right knee flex 127 deg    Goal: Patient will be able to lift at least 40# box with good body mechanics and no increase of pain so he can return to work safely. Status at last note/certification: Progressing-patient able to lift 35# box from waist and 30# box from floor with fair squatting mechanics  Status at discharge: MET    Goal: Patient will be able community distance with no AD to improve ease and safety with ADLs and job duties.   Status at last note/certification: Progressing-patient ambulating at home and in community without [facility] with increased frequency  Status at discharge: MET-patient has not used SPC in 2 weeks      ASSESSMENT/RECOMMENDATIONS: Patient has made good progress with skilled outpatient PT. Right knee AROM and right LE strength has improved. He reports pain at max 2/10 at time of therapy sessions. Patient ambulating with improved gait mechanics with addition of heel lift to left shoe. He no longer requires ambulation with AD. Patient educated on progressive HEP with good understanding; he intends to purchase and use ankle weights for activities at home. Patient is scheduled to return to work on 3/10/21.   At this time, patient is appropriate for discharge from skilled outpatient PT.     [x]Discontinue therapy: [x]Patient has reached or is progressing toward set goals      []Patient is non-compliant or has abdicated      []Due to lack of appreciable progress towards set goals    Makayla Louise, PT 3/5/2021 1:46 PM

## 2021-03-05 NOTE — PROGRESS NOTES
PT DAILY TREATMENT NOTE     Patient Name: Michael Stone  Date:3/5/2021  : 1975  [x]  Patient  Verified  Payor: BLUE CROSS / Plan: [address] [place] [address] / Product Type: PPO /    In time: 3:17P  Out time:3:46P  Total Treatment Time (min): 29  Visit #: 9 of 12    Medicare/BCBS Only   Total Timed Codes (min):  29 1:1 Treatment Time:  29       Treatment Area: Right knee pain [M25.561]    SUBJECTIVE  Pain Level (0-10 scale): 2/10  Any medication changes, allergies to medications, adverse drug reactions, diagnosis change, or new procedure performed?: [x] No    [] Yes (see summary sheet for update)  Subjective functional status/changes:   [] No changes reported    Patient reports is prepared for d/c today. 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 He has no current questions about performing his HEP or performing work activities. He reports he still has continued pain and feels it may still take some type to go away. He also reports his right knee has started \"cracking\" again.    OBJECTIVE       18 min Therapeutic Exercise:  [x] See flow sheet :   Rationale: increase ROM and increase strength to improve the patients ability to perform ADLs and work tasks with increased ease    10 min Therapeutic Activity:  [x]  See flow sheet : FOTO, goal assessment   Rationale: increase ROM and increase strength  to improve the patients ability to perform ADLs and work tasks with increased ease         With   [] TE   [] TA   [] neuro   [] other: Patient Education: [x] Review HEP    [] Progressed/Changed HEP based on:   [] positioning   [] body mechanics   [] transfers   [] heat/ice application    [] other:      Other Objective/Functional Measures:     Session abbreviated-patient arrived >15 late to appointment    FOTO=72  Patient educated on performing activities at home with resistance bands until he obtains ankle weights  Improved form with sidelying hip abd    Pain Level (0-10 scale) post treatment: 1/10    ASSESSMENT/Changes in Function: See Discharge Summary          []  See Plan of Care  []  See progress note/recertification  [x]  See Discharge Summary         Progress towards goals / Updated goals:  1. Patient will improve FOTO score by 17 points to 76/100 to show improvement with functional mobility performance. NOT MET-72/100  2. Patient will improve right knee flex AROM to at least 120 deg in order to ambulate in household and community with normal and safe gait pattern. Goal met. 127* 3/4/21  3. Patient will be able to lift at least 40# box with good body mechanics and no increase of pain so he can return to work safely.  MET (3/1/21)  4. Patient will be able community distance with no AD to improve ease and safety with ADLs and job duties Progressing.   No AD but pt has not attempted long distances 3/4/21   MET    PLAN  []  Upgrade activities as tolerated     []  Continue plan of care  []  Update interventions per flow sheet       [x]  Discharge due to: patient progressing with goals with skilled outpatient PT, returns to work 3/10 and prepared to continue with independent progression  []  Other:_      Guillaume Rice, PT 3/5/2021  1:46 PM    Future Appointments   Date Time Provider Jaycee Grimes   3/5/2021  3:00 PM Katya Schroeder, PT [facility]

## 2022-03-19 PROBLEM — M17.11 LOCALIZED OSTEOARTHRITIS OF RIGHT KNEE: Status: ACTIVE | Noted: 2020-12-09

## 2022-12-08 ENCOUNTER — HOSPITAL ENCOUNTER (OUTPATIENT)
Dept: PHYSICAL THERAPY | Age: 47
Discharge: HOME | End: 2022-12-08
Payer: COMMERCIAL

## 2022-12-08 PROCEDURE — 97161 PT EVAL LOW COMPLEX 20 MIN: CPT

## 2022-12-08 PROCEDURE — 97110 THERAPEUTIC EXERCISES: CPT

## 2022-12-08 NOTE — THERAPY EVALUATION
[facility] [place] [employer] [facility]  [address] [place] [address]  [phone] fax    Plan of Care/ Statement of Necessity for Physical Therapy Services    Patient name: Colin Rock Start of Care: 2022   Referral source: PENNY Peralta : 1975    Medical Diagnosis: Right knee pain [M25.561]  Payor: BLUE CROSS / Plan: [address] [place] [address] / Product Type: PPO /  Onset Date: 22    Treatment Diagnosis: right knee pain   Prior Hospitalization: see medical history Provider#: [id number]   Medications: Verified on Patient summary List    Comorbidities: HTN   Prior Level of Function: Ind with ambulation, Ind with ADLs, 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 Assessment/ key information:  pt. Is a 49year old male s/p right TKA revision of unilateral arthroplasty on 22. He reports he has been following his post op instructions and has been doing ankle pumps at home. He uses RW for ambulation but does report trying cane and using walls to get around house. He reports having some numbness in the back of his leg. He presents with decreased right knee AROM 3-70 degrees with pain at end ranges. Right knee MMT ext/flex was 4-/5. He has an extensor lag with SLR. Instability with Romberg balance. He ambulated with RW with decreased step length, decreased weight shift to right, decreased heel strike, and decreased right knee flexion. Negative Bj's sign. Skilled PT is medically necessary in order to improve right knee mobility and strength for increased ease of ambulation and improved quality of life.      Evaluation Complexity History LOW Complexity : Zero comorbidities / personal factors that will impact the outcome / POC; Examination MEDIUM Complexity : 3 Standardized tests and measures addressing body structure, function, activity limitation and / or participation in recreation  ;Presentation LOW Complexity : Stable, uncomplicated  ;Clinical Decision Making MEDIUM Complexity : FOTO score of 26-74  Overall Complexity Rating: LOW   Problem List: pain affecting function, decrease ROM, decrease strength, edema affecting function, impaired gait/ balance, decrease ADL/ functional abilitiies, decrease activity tolerance, decrease flexibility/ joint mobility, and decrease transfer abilities   Treatment Plan may include any combination of the following: Therapeutic exercise, Neuromuscular reeducation, Manual therapy, Therapeutic activity, Self care/home management, Electric stim unattended , and Gait training  Patient / Family readiness to learn indicated by: asking questions  Persons(s) to be included in education: patient (P)  Barriers to Learning/Limitations: None  Patient Goal (s): to have less pain  Patient Self Reported Health Status: excellent  Rehabilitation Potential: good    Short Term Goals: To be accomplished in 1 weeks:  Patient will demonstrate compliance with HEP in order to improve right knee AROM for increased ease of ambulation. Long Term Goals: To be accomplished in 6 weeks:  Goal: Patient will improve FOTO score by 28 points in order to demonstrate a significant improvement in function. Status at evaluation/last progress note: 39 points    2. Goal: Patient will improve right knee AROM 0-120 degrees in order to increase ease of ambulation. Status at evaluation/last progress note: 3-70 degrees    3. Goal: Patient will improve right knee ext/flex MMT to 4+/5 in order to increase ease of household chores. Status at evaluation/last progress note: 4-/5    4. Goal: Patient will ambulate community distances without AD in order to return to PLOF. Status at evaluation/last progress note: RW for community ambulation. Frequency / Duration: Patient to be seen 2-3 times per week for 6 weeks.     Patient/  Caregiver education and instruction: Diagnosis, prognosis, exercises   [x]  Plan of care has been reviewed with REMBERTO Barnes, PT 12/8/2022 1:16 PM    ________________________________________________________________________    I certify that the above Therapy Services are being furnished while the patient is under my care. I agree with the treatment plan and certify that this therapy is necessary.     [address] [place] [address] Signature:____________Date:_________TIME:________     PENNY Patel  ** Signature, Date and Time must be completed for valid certification **    Please sign and return to [facility]  [address] [place] [address]  [phone] fax

## 2022-12-08 NOTE — PROGRESS NOTES
PT DAILY TREATMENT NOTE/KNEE EVAL     Patient Name: Dayana Mallory  Date:2022  : 1975  [x]  Patient  Verified  Payor: BLUE CROSS / Plan: [address] [place] [address] / Product Type: PPO /    In time: 12:30  Out time: 1:05  Total Treatment Time (min): 35  Visit #: 1 of     Medicare/[facility] Only   Total Timed Codes (min):  8 1:1 Treatment Time:  35     Treatment Area: Unilateral primary osteoarthritis, right knee [M17.11]    SUBJECTIVE  Pain Level (0-10 scale):  10/10  []constant []intermittent []improving []worsening []no change since onset    Any medication changes, allergies to medications, adverse drug reactions, diagnosis change, or new procedure performed?: [x] No    [] Yes (see summary sheet for update)  Subjective functional status/changes:       Mechanism of Injury:  22 TKA revison from unicompartmental. Has been doing ankle pumps and trying to move his knee pain. Uses RW in community and cane or walls at home. Reports his whole leg is swollen. Sees doctor again on . Has 1 step to enter house. 2 story house. Numbness in the back of his leg and side. 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 OBJECTIVE/EXAMINATION    8 min Therapeutic Exercise:  [] See flow sheet : HEP   Rationale: increase ROM and increase strength to improve the patients ability to perform ADLs          With   [x] TE   [] TA   [] neuro   [] other: Patient Education: [x] Review HEP    [] Progressed/Changed HEP based on:   [] positioning   [] body mechanics   [] transfers   [] heat/ice application    [] other:      Physical Therapy Evaluation - Knee    Gait:  [] Normal    [x] Abnormal    [x] Antalgic    [] NWB    Device: RW    Describe: decreased step length, decreased right heel strike, decreased right knee flexion, decreased weight shift to right side.      ROM / Strength  [] Unable to assess                  AROM                      PROM Strength (1-5)    Left Right Left Right Left Right   Hip Flexion     5 4-    Extension          Abduction          Adduction         Knee Flexion 132 70   5 4-    Extension -5 3   5 4-   Ankle Plantarflexion     5 4    Dorsiflexion     5 5   Ext lag with SLR    Flexibility: [] Unable to assess at this time  Hamstrings:    (L) Tightness= [] WNL   [] Min   [] Mod   [] Severe    (R) Tightness= [] WNL   [] Min   [x] Mod   [] Severe  Quadriceps:    (L) Tightness= [] WNL   [] Min   [] Mod   [] Severe    (R) Tightness= [] WNL   [] Min   [] Mod   [] Severe  Gastroc:      (L) Tightness= [] WNL   [] Min   [] Mod   [] Severe    (R) Tightness= [] WNL   [] Min   [] Mod   [] Severe  Other:    Other tests/comments:  Discoloration in posterior knee  Negative Bj's sign  No excessive redness noted   Instability with attempting Romberg balance    Pain Level (0-10 scale) post treatment: 10/10    ASSESSMENT/Changes in Function:      [x]  See Plan of Care  []  See progress note/recertification  []  See Discharge Summary         Progress towards goals / Updated goals:  See POC    PLAN  []  Upgrade activities as tolerated     [x]  Continue plan of care  []  Update interventions per flow sheet       []  Discharge due to:_  []  Other:_      Abdullahi Weiss, PT 12/8/2022  12:30 PM

## 2022-12-12 ENCOUNTER — HOSPITAL ENCOUNTER (OUTPATIENT)
Dept: PHYSICAL THERAPY | Age: 47
Discharge: HOME | End: 2022-12-12
Payer: COMMERCIAL

## 2022-12-12 PROCEDURE — 97116 GAIT TRAINING THERAPY: CPT

## 2022-12-12 PROCEDURE — 97530 THERAPEUTIC ACTIVITIES: CPT

## 2022-12-12 NOTE — PROGRESS NOTES
PT DAILY TREATMENT NOTE     Patient Name: Marie Tobar  Date:2022  : 1975  [x]  Patient  Verified  Payor: [employer] / Plan: [address] [place] [address] / Product Type: PPO /    In time:9:56  Out time:10:50  Total Treatment Time (min): 47  Visit #: 2 of     Medicare/BCBS Only   Total Timed Codes (min):  44 1:1 Treatment Time:  34       Treatment Area: Right knee pain [M25.561]    SUBJECTIVE  Pain Level (0-10 scale): 10/10  Any medication changes, allergies to medications, adverse drug reactions, diagnosis change, or new procedure performed?: [x] No    [] Yes (see summary sheet for update)  Subjective functional status/changes:   [] No changes reported  Pt reports he's in a lot of pain. He states he almost fell the other day because his right knee went inward.     OBJECTIVE    Modality rationale: decrease edema, decrease inflammation, and decrease pain to improve the patients ability to perform adls with ease   Min Type Additional Details    [] Estim:  []Unatt       []IFC  []Premod                        []Other:  []w/ice   []w/heat  Position:  Location:    [] Estim: []Att    []TENS instruct  []NMES                    []Other:  []w/US   []w/ice   []w/heat  Position:  Location:    []  Traction: [] Cervical       []Lumbar                       [] Prone          []Supine                       []Intermittent   []Continuous Lbs:  [] before manual  [] after manual    []  Ultrasound: []Continuous   [] Pulsed                           []1MHz   []3MHz W/cm2:  Location:    []  Iontophoresis with dexamethasone         Location: [] Take home patch   [] In clinic   10 [x]  Ice     []  heat  []  Ice massage  []  Laser   []  Anodyne Position: supine, right knee in ext, left knee bolstered  Location: right knee    []  Laser with stim  []  Other:  Position:  Location:    []  Vasopneumatic Device    []  Right     []  Left  Pre-treatment girth:  Post-treatment girth:  Measured at (location):  Pressure: [] lo [] med [] hi   Temperature: [] lo [] med [] hi   [x] Skin assessment post-treatment:  [x]intact []redness- no adverse reaction    []redness - adverse reaction:     21 min Therapeutic Exercise:  [x] See flow sheet :   Rationale: increase ROM and increase strength to improve the patients ability to perform daily tasks with ease    13 min Therapeutic Activity:  []  See flow sheet : sit to stands from plinth, namrata step overs   Rationale: increase strength, improve coordination, and improve balance  to improve the patients ability to transfer and amb with less difficulty    10 min Gait Trainin feet with rolling walker on level surfaces with SBA level of assist   Rationale: forward and backward walking with mirror in order to increase pt ability to ambulate with increased knee flexion and extension          With   [x] TE   [] TA   [] neuro   [] other: Patient Education: [x] Review HEP    [] Progressed/Changed HEP based on:   [] positioning   [] body mechanics   [] transfers   [] heat/ice application    [] other:      Other Objective/Functional Measures:   Knee ROM: 3-76 deg  Full revolutions achieved on bike  Cued to increase step length forward/backward to achieve greater knee flex/ext  Cued to remain upright with LAQ to prevent compensation    Pt reported bandage felt itchy  Needed water breaks    Pain Level (0-10 scale) post treatment: 8/10    ASSESSMENT/Changes in Function:   Pt presents with increased pain and tightness on medial and lateral knee. Demonstrates decreased step length and stance time on right LE when amb with rolling walker. Limited with sit<>stands d/t difficulty flexing knee. ROM slowly improving as evidenced by recent measurement. Cont with POC to increase strength and ROM to ease pt ability to perform functional tasks.      Patient will continue to benefit from skilled PT services to modify and progress therapeutic interventions, address functional mobility deficits, address ROM deficits, address strength deficits, analyze and address soft tissue restrictions, analyze and cue movement patterns, analyze and modify body mechanics/ergonomics, assess and modify postural abnormalities, address imbalance/dizziness, and instruct in home and community integration to attain remaining goals. [x]  See Plan of Care  []  See progress note/recertification  []  See Discharge Summary         Progress towards goals / Updated goals:  Short Term Goals: To be accomplished in 1 weeks:  Patient will demonstrate compliance with HEP in order to improve right knee AROM for increased ease of ambulation. Met: compliant 12/12/22     Long Term Goals: To be accomplished in 6 weeks:  Goal: Patient will improve FOTO score by 28 points in order to demonstrate a significant improvement in function. Status at evaluation/last progress note: 39 points     2. Goal: Patient will improve right knee AROM 0-120 degrees in order to increase ease of ambulation. Status at evaluation/last progress note: 3-70 degrees  Progressing: 3-76 deg 12/12/22     3. Goal: Patient will improve right knee ext/flex MMT to 4+/5 in order to increase ease of household chores. Status at evaluation/last progress note: 4-/5     4. Goal: Patient will ambulate community distances without AD in order to return to [facility]. Status at evaluation/last progress note: RW for community ambulation. PLAN  []  Upgrade activities as tolerated     [x]  Continue plan of care  []  Update interventions per flow sheet       []  Discharge due to:_  []  Other:_      Sheryle Filler, SPT 12/12/2022  7:51 AM    I was present during the entire treatment, directing and participating in the treatment.    Stephane Chawla DPFLO     Future Appointments   Date Time Provider Jaycee Grimes   12/12/2022 10:00 AM Abdirizak Yan, PT [facility]   12/14/2022 10:30 AM Thao Shepard PTA [facility]   12/16/2022 10:00 AM Sobeida Sauceda, PTA [facility] 12/19/2022 10:00 AM Daley Halon, PTA [facility]   12/21/2022 11:00 AM Daley Halon, PTA [facility]   12/28/2022 10:00 AM Larraine Skeeters, PT [facility]   12/30/2022  9:00 AM Larraine Skeeters, PT [facility]   1/3/2023  9:30 AM Daley Halon, PTA [facility]   1/5/2023 10:30 AM Daley Halon, PTA [facility]   1/9/2023 11:00 AM Daley Halon, PTA [facility]   1/11/2023 11:00 AM Larraine Skeeters, PT [facility]   1/13/2023 10:30 AM Larraine Skeeters, PT [facility]   1/16/2023  9:30 AM Daley Halon, PTA [facility]   1/18/2023  9:30 AM Daley Halon, PTA [facility]

## 2022-12-14 ENCOUNTER — HOSPITAL ENCOUNTER (OUTPATIENT)
Dept: PHYSICAL THERAPY | Age: 47
Discharge: HOME | End: 2022-12-14
Payer: COMMERCIAL

## 2022-12-14 PROCEDURE — 97110 THERAPEUTIC EXERCISES: CPT

## 2022-12-14 NOTE — PROGRESS NOTES
PT DAILY TREATMENT NOTE - [facility]     Patient Name: Tony Archuleta  Date:2022  : 1975  [x]  Patient  Verified  Payor: [employer] / Plan: [address] [place] [address] / Product Type: PPO /    In time:304  Out time:341  Total Treatment Time (min): 37    Visit #: 3 of     Treatment Area: Right knee pain [M25.561]    SUBJECTIVE  Pain Level (0-10 scale): 9/10  Any medication changes, allergies to medications, adverse drug reactions, diagnosis change, or new procedure performed?: [x] No    [] Yes (see summary sheet for update)  Subjective functional status/changes:   [] No changes reported  Reports he is always in pain.      OBJECTIVE    Modality rationale: decrease pain and increase tissue extensibility to improve the patients ability to ease with adls;   Min Type Additional Details    [] Estim:  []Unatt       []IFC  []Premod                        []Other:  []w/ice   []w/heat  Position:  Location:    [] Estim: []Att    []TENS instruct  []NMES                    []Other:  []w/US   []w/ice   []w/heat  Position:  Location:    []  Traction: [] Cervical       []Lumbar                       [] Prone          []Supine                       []Intermittent   []Continuous Lbs:  [] before manual  [] after manual    []  Ultrasound: []Continuous   [] Pulsed                           []1MHz   []3MHz W/cm2:  Location:    []  Iontophoresis with dexamethasone         Location: [] Take home patch   [] In clinic   10  x Ice     []  heat  []  Ice massage  []  Laser   []  Anodyne Position:supine  Location:right knee    []  Laser with stim  []  Other:  Position:  Location:    []  Vasopneumatic Device Pressure:       [] lo [] med [] hi   Temperature: [] lo [] med [] hi   [] Skin assessment post-treatment:  []intact []redness- no adverse reaction    []redness - adverse       27 min Therapeutic Exercise:  [] See flow sheet :   Rationale: increase ROM and increase strength to improve the patients ability to ease with adl's  With   [] TE   [] TA   [] neuro   [] other: Patient Education: [x] Review HEP    [] Progressed/Changed HEP based on:   [] positioning   [] body mechanics   [] transfers   [] heat/ice application    [] other:      Other Objective/Functional Measures: Supine Knee AAROM: flexion= 93 deg  Full revolutions achieved on bike   -posterior knee pain and lateral jt line pain after bike.   -pt had moderate right leg cramps after bike that decreased after passive heel slides. Pain Level (0-10 scale) post treatment: 9/10    ASSESSMENT/Changes in Function: Pt had onset of lateral gastroc muscle cramps close to musculotendinous junction. Pt had pain with Heel raise and weight bearing ex's today. Ex' swer modified or held. Pt educated on blood clot identifiers and to go to ER as pain persist. He is recommended to elevate and perform ankle pumps to determine any changes. Patient will continue to benefit from skilled PT services to modify and progress therapeutic interventions, address functional mobility deficits, address ROM deficits, address strength deficits, analyze and address soft tissue restrictions, analyze and cue movement patterns, analyze and modify body mechanics/ergonomics, assess and modify postural abnormalities, and address imbalance/dizziness to attain remaining goals. []  See Plan of Care  [x]  See progress note/recertification  []  See Discharge Summary         Progress towards goals / Updated goals:  Patient will demonstrate compliance with HEP in order to improve right knee AROM for increased ease of ambulation. Met: compliant 12/12/22     Long Term Goals: To be accomplished in 6 weeks:  Goal: Patient will improve FOTO score by 28 points in order to demonstrate a significant improvement in function. Status at evaluation/last progress note: 39 points     2. Goal: Patient will improve right knee AROM 0-120 degrees in order to increase ease of ambulation.    Status at evaluation/last progress note: 3-70 degrees  Progressing: 3-76 deg 12/12/22     3. Goal: Patient will improve right knee ext/flex MMT to 4+/5 in order to increase ease of household chores. Status at evaluation/last progress note: 4-/5     4. Goal: Patient will ambulate community distances without AD in order to return to [facility]. Status at evaluation/last progress note: RW for community ambulation.     PLAN  [x]  Upgrade activities as tolerated     [x]  Continue plan of care  []  Update interventions per flow sheet       []  Discharge due to:_  []  Other:_      Jennifer Campuzano, PT 12/14/2022  3:10 PM    Future Appointments   Date Time Provider Jaycee Grimes   12/16/2022 10:00 AM Jc Davis, PTA [facility]   12/19/2022 10:00 AM Radha Chawla, PTA [facility]   12/21/2022 11:00 AM Radhakatie Chawla, PTA [facility]   12/28/2022 10:00 AM Devin Fitzgerald, PT [facility]   12/30/2022  9:00 AM Devin Fitzgerald, PT [facility]   1/3/2023  9:30 AM Radha Chawla, PTA [facility]   1/5/2023 10:30 AM Radha Chawla, PTA [facility]   1/9/2023 11:00 AM Radhakatie Chawla, PTA [facility]   1/11/2023 11:00 AM Devin Fitzgerald, PT [facility]   1/13/2023 10:30 AM Devin Fitzgerald, PT [facility]   1/16/2023  9:30 AM Radhakatie Chawla, PTA [facility]   1/18/2023  9:30 AM Radha Chawla, PTA [facility]

## 2022-12-16 ENCOUNTER — HOSPITAL ENCOUNTER (OUTPATIENT)
Dept: PHYSICAL THERAPY | Age: 47
Discharge: HOME | End: 2022-12-16
Payer: COMMERCIAL

## 2022-12-16 PROCEDURE — 97530 THERAPEUTIC ACTIVITIES: CPT

## 2022-12-16 NOTE — PROGRESS NOTES
PT DAILY TREATMENT NOTE - [facility]     Patient Name: Tirso Frame  Date:2022  : 1975  [x]  Patient  Verified  Payor: [employer] / Plan: [address] [place] [address] / Product Type: PPO /    In time: 10:05  Out time: 10:13  Total Treatment Time (min): 8    Visit #: - of     Treatment Area: Right knee pain [M25.561]    SUBJECTIVE  Pain Level (0-10 scale): 10/10  Any medication changes, allergies to medications, adverse drug reactions, diagnosis change, or new procedure performed?: [x] No    [] Yes (see summary sheet for update)  Subjective functional status/changes:   [] No changes reported  Patient reports he could be doing better. He is in 10/10 pain and has not been to the doctor. He still has pain in the calf and his incision is itchy. OBJECTIVE    8 min Therapeutic Activity:  [] See flow sheet : pt education and assessment of incision   Rationale: increase ROM and increase strength to improve the patients ability to ease with adl's    With   [] TE   [] TA   [] neuro   [] other: Patient Education: [] Review HEP    [] Progressed/Changed HEP based on:   [] positioning   [] body mechanics   [] transfers   [] heat/ice application    [] other:      Other Objective/Functional Measures:   - assessed right knee incision  - pt education on risk of DVT and needs to seek medical attention and obtain clearance note    Wells Clinical Prediction Rule    Criteria Points  1. Active cancer: ongoing treatment, within previous 6 months, or  palliative) - 0  2. Paralysis, paresis or recent immobilization of LE - 0  3. Recently bedridden for > 3 days or major surgery within 4 weeks - 0  4. Localized tenderness along deep venous system distribution assessed by  firm palpation in posterior calf, the popliteal space, and along the  femoral vein in anterior thigh and groin - 1  5. Entire LE swelling - 0  6. Calf swelling > 3cm when compared with asymptomatic LE. Measured  10cm below tibial tuberosity - 0  7. Pitting edema (greater in symptomatic LE) - 0  8. Collateral superficial veins (nonvaricose) - 0  9. Alternative diagnosis as likely or greater than that of proximal DVT:  cellulitis, calf strain, Baker cyst and postoperative swelling. - 0    Tally total points. The probability of patients having a DVT are: (1)   0= low   1-2= moderate   3= high    **information for table retrieved from: http://www. physio-pedia.com/Deep_Vein_Thrombosis    Pain Level (0-10 scale) post treatment: 10/10    ASSESSMENT/Changes in Function:   Pt presents with continued pain and itching at incision and pain in calf. Pt educated on blood clot identifiers and to go to ER as pain persist. He was instructed to bring clearance note to next appt. Patient will continue to benefit from skilled PT services to modify and progress therapeutic interventions, address functional mobility deficits, address ROM deficits, address strength deficits, analyze and address soft tissue restrictions, analyze and cue movement patterns, analyze and modify body mechanics/ergonomics, assess and modify postural abnormalities, and address imbalance/dizziness to attain remaining goals. []  See Plan of Care  [x]  See progress note/recertification  []  See Discharge Summary         Progress towards goals / Updated goals:  Patient will demonstrate compliance with HEP in order to improve right knee AROM for increased ease of ambulation. Met: compliant 12/12/22     Long Term Goals: To be accomplished in 6 weeks:  Goal: Patient will improve FOTO score by 28 points in order to demonstrate a significant improvement in function. Status at evaluation/last progress note: 39 points     2. Goal: Patient will improve right knee AROM 0-120 degrees in order to increase ease of ambulation. Status at evaluation/last progress note: 3-70 degrees  Progressing: 3-76 deg 12/12/22     3.    Goal: Patient will improve right knee ext/flex MMT to 4+/5 in order to increase ease of household chores. Status at evaluation/last progress note: 4-/5     4. Goal: Patient will ambulate community distances without AD in order to return to [facility]. Status at evaluation/last progress note: RW for community ambulation.     PLAN  [x]  Upgrade activities as tolerated     [x]  Continue plan of care  []  Update interventions per flow sheet       []  Discharge due to:_  []  Other:_      Trevor Guevara, PTA 12/16/2022  10:13 AM    Future Appointments   Date Time Provider Jaycee Grimes   12/16/2022 10:00 AM Erniefarooq Gregorio, PTA [facility]   12/19/2022 10:00 AM Edison Saucer, PTA [facility]   12/21/2022 11:00 AM Edison Saucer, PTA [facility]   12/28/2022 10:00 AM Iver Becket, PT [facility]   12/30/2022  9:00 AM Iver Becket, PT [facility]   1/3/2023  9:30 AM Edison Saucer, PTA [facility]   1/5/2023 10:30 AM Edison Saucer, PTA [facility]   1/9/2023 11:00 AM Edison Saucer, PTA [facility]   1/11/2023 11:00 AM Iver Becket, PT [facility]   1/13/2023 10:30 AM Iver Becket, PT [facility]   1/16/2023  9:30 AM Edison Saucer, PTA [facility]   1/18/2023  9:30 AM Edison Saucer, PTA [facility]

## 2022-12-28 ENCOUNTER — HOSPITAL ENCOUNTER (OUTPATIENT)
Dept: PHYSICAL THERAPY | Age: 47
Discharge: HOME | End: 2022-12-28
Payer: COMMERCIAL

## 2022-12-28 PROCEDURE — 97110 THERAPEUTIC EXERCISES: CPT

## 2022-12-28 PROCEDURE — 97530 THERAPEUTIC ACTIVITIES: CPT

## 2022-12-28 NOTE — PROGRESS NOTES
PT DAILY TREATMENT NOTE     Patient Name: Gama Peterson  Date:2022  : 1975  [x]  Patient  Verified  Payor: BLUE [employer]OSS / Plan: [address] [place] [address] / Product Type: PPO /    In time: 10:03  Out time: 10:31  Total Treatment Time (min): 28  Visit #: 4 of     Medicare/BCBS Only   Total Timed Codes (min):  28 1:1 Treatment Time:  28       Treatment Area: Right knee pain [M25.561]    SUBJECTIVE  Pain Level (0-10 scale):  7/10  Any medication changes, allergies to medications, adverse drug reactions, diagnosis change, or new procedure performed?: [x] No    [] Yes (see summary sheet for update)  Subjective functional status/changes:   [] No changes reported  Pt. Reports he is doing better today. He reports blood clot was ruled out by his doctor and he has been keeping up with his exercises. He still has pain in his calf. OBJECTIVE    20 min Therapeutic Exercise:  [x] See flow sheet :   Rationale: increase ROM and increase strength to improve the patients ability to perform ADLs    8 min Therapeutic Activity:  []  See flow sheet : step ups, AROM measurement, education on prone quad stretch   Rationale: increase ROM and increase strength  to improve the patients ability to ambulate            With   [x] TE   [] TA   [] neuro   [] other: Patient Education: [x] Review HEP    [] Progressed/Changed HEP based on:   [] positioning   [] body mechanics   [] transfers   [] heat/ice application    [] other:      Other Objective/Functional Measures:   Pt. Ambulated into clinic without AD and improved knee flexion during ambulation  Right knee AROM 2-92 degrees  No difficulty with LAQ on right LE   Left hip pain during bike today. Pain Level (0-10 scale) post treatment: 6/10    ASSESSMENT/Changes in Function: pt. Is progressing well with physical therapy. He had a gap in treatment to rule out blood clot but returned today. He ambulates without an AD with improving gait pattern.  He also demonstrates improving knee flexion mobility. He continues to demonstrate decreased hamstring and quad flexibility which is contributing to decreased knee mobility. Patient will continue to benefit from skilled PT services to modify and progress therapeutic interventions, address functional mobility deficits, address ROM deficits, address strength deficits, analyze and address soft tissue restrictions, analyze and cue movement patterns, and analyze and modify body mechanics/ergonomics to attain remaining goals. Progress towards goals / Updated goals:  Patient will demonstrate compliance with HEP in order to improve right knee AROM for increased ease of ambulation. Met: compliant 22     Long Term Goals: To be accomplished in 6 weeks:  Goal: Patient will improve FOTO score by 28 points in order to demonstrate a significant improvement in function. Status at evaluation/last progress note: 39 points     2. Goal: Patient will improve right knee AROM 0-120 degrees in order to increase ease of ambulation. Status at evaluation/last progress note: 3-70 degrees  Progressin-92 degrees (22)     3. Goal: Patient will improve right knee ext/flex MMT to 4+/5 in order to increase ease of household chores. Status at evaluation/last progress note: 4-/5     4. Goal: Patient will ambulate community distances without AD in order to return to [facility]. Status at evaluation/last progress note: RW for community ambulation.     PLAN  []  Upgrade activities as tolerated     [x]  Continue plan of care  []  Update interventions per flow sheet       []  Discharge due to:_  []  Other:_      Leif Rolle PT 2022  7:18 AM    Future Appointments   Date Time Provider Jaycee Grimes   2022 10:00 AM Moises Marlow PT [facility]   2022  9:00 AM Moises Marlow PT [facility]   1/3/2023  9:30 AM REMBERTO Choudhary [facility]   2023 10:30 AM Robert Gilebrt PTA [id number]   1/9/2023 11:00 AM Destiny Bagley, PTA [facility]   1/11/2023 11:00 AM Mariusz Matos, PT [facility]   1/13/2023 10:30 AM Mariusz Matos, PT [facility]   1/16/2023  9:30 AM Destiny Bagley, PTA [facility]   1/18/2023  9:30 AM Destiny Bagley, PTA [facility]

## 2022-12-30 ENCOUNTER — HOSPITAL ENCOUNTER (OUTPATIENT)
Dept: PHYSICAL THERAPY | Age: 47
End: 2022-12-30
Payer: COMMERCIAL

## 2022-12-30 PROCEDURE — 97110 THERAPEUTIC EXERCISES: CPT

## 2022-12-30 NOTE — PROGRESS NOTES
PT DAILY TREATMENT NOTE     Patient Name: Jaci Archibald  Date:2022  : 1975  [x]  Patient  Verified  Payor: BLUE CROSS / Plan: [address] [place] [address] / Product Type: PPO /    In time: 9:03  Out time: 9:36  Total Treatment Time (min): 33  Visit #: 5 of     Medicare/BCBS Only   Total Timed Codes (min):  33 1:1 Treatment Time:  33       Treatment Area: Right knee pain [M25.561]    SUBJECTIVE  Pain Level (0-10 scale):  6/10  Any medication changes, allergies to medications, adverse drug reactions, diagnosis change, or new procedure performed?: [x] No    [] Yes (see summary sheet for update)  Subjective functional status/changes:   [] No changes reported  Pt. He was sore after last visit but is doing ok. He reports feeling like his right leg is longer than his left leg now and previously during PT he had his hips adjusted and would like to do that again. OBJECTIVE    33 min Therapeutic Exercise:  [x] See flow sheet :   Rationale: increase ROM and increase strength to improve the patients ability to perform ADLs          With   [x] TE   [] TA   [] neuro   [] other: Patient Education: [x] Review HEP    [] Progressed/Changed HEP based on:   [] positioning   [] body mechanics   [] transfers   [] heat/ice application    [] other:      Other Objective/Functional Measures:   Pt. Was educated that hip alignment is not a priority right now and his walk will even out has his mobility and strength improves  Pt. Did have left upslip and was educated on hip depressions as needed for HEP  He continues to demonstrate decreased right knee flexion mobility and was educated on importance of heel slides for HEP. Ambulated into clinic with improved gait pattern and no AD. Pain Level (0-10 scale) post treatment: 5/10    ASSESSMENT/Changes in Function:  pt. Is progressing with physical therapy.  He demonstrates improving gait pattern during ambulation but continues to be limited by decreased right knee mobility. He reports compliance with HEP and was educated on adding heel slides into HEP. He continues to be most limited by decreased knee mobility and demonstrates improving strength. Patient will continue to benefit from skilled PT services to modify and progress therapeutic interventions, address functional mobility deficits, address ROM deficits, address strength deficits, analyze and address soft tissue restrictions, analyze and cue movement patterns, analyze and modify body mechanics/ergonomics, assess and modify postural abnormalities, and address imbalance/dizziness to attain remaining goals. Progress towards goals / Updated goals:  Patient will demonstrate compliance with HEP in order to improve right knee AROM for increased ease of ambulation. Met: compliant 22     Long Term Goals: To be accomplished in 6 weeks:  Goal: Patient will improve FOTO score by 28 points in order to demonstrate a significant improvement in function. Status at evaluation/last progress note: 39 points     2. Goal: Patient will improve right knee AROM 0-120 degrees in order to increase ease of ambulation. Status at evaluation/last progress note: 3-70 degrees  Progressin-92 degrees (22)     3. Goal: Patient will improve right knee ext/flex MMT to 4+/5 in order to increase ease of household chores. Status at evaluation/last progress note: 4-/5     4. Goal: Patient will ambulate community distances without AD in order to return to [facility]. Status at evaluation/last progress note: RW for community ambulation.     PLAN  []  Upgrade activities as tolerated     [x]  Continue plan of care  []  Update interventions per flow sheet       []  Discharge due to:_  []  Other:_      Pretty Mcbride, PT 2022  7:03 AM    Future Appointments   Date Time Provider Jaycee Grimes   2022  9:00 AM João Pérez, PT [facility]   1/3/2023  9:30 AM Dimas Ashford PTA [facility] 1/5/2023 10:30 AM [place] Sos, PTA [facility]   1/9/2023 11:00 AM [place] Sos, PTA [facility]   1/11/2023 11:00 AM Mendoza Kenny, PT [facility]   1/13/2023 10:30 AM Mendoza Kenny, PT [facility]   1/16/2023  9:30 AM [place] Sos, PTA [facility]   1/18/2023  9:30 AM [place] Sos, PTA [facility]

## 2023-01-03 ENCOUNTER — HOSPITAL ENCOUNTER (OUTPATIENT)
Dept: PHYSICAL THERAPY | Age: 48
Discharge: HOME | End: 2023-01-03
Payer: COMMERCIAL

## 2023-01-03 PROCEDURE — 97110 THERAPEUTIC EXERCISES: CPT

## 2023-01-03 NOTE — PROGRESS NOTES
PT DAILY TREATMENT NOTE     Patient Name: Gama Peterson  Date:1/3/2023  : 1975  [x]  Patient  Verified  Payor: BLUE CROSS / Plan: [address] [place] [address] / Product Type: PPO /    In time:935  Out time:1000  Total Treatment Time (min): 25  Visit #: 6 of     Medicare/BCBS Only   Total Timed Codes (min):  25 1:1 Treatment Time:  25       Treatment Area: Right knee pain [M25.561]    SUBJECTIVE  Pain Level (0-10 scale): 6/10  Any medication changes, allergies to medications, adverse drug reactions, diagnosis change, or new procedure performed?: [x] No    [] Yes (see summary sheet for update)  Subjective functional status/changes:   [] No changes reported  Pt stated that he just took his pain medication so his pain is up a little    OBJECTIVE    25 min Therapeutic Exercise:  [x] See flow sheet :   Rationale: increase ROM and increase strength to improve the patients ability to increase ease with ADLs    With   [x] TE   [] TA   [] neuro   [] other: Patient Education: [x] Review HEP    [] Progressed/Changed HEP based on:   [] positioning   [] body mechanics   [] transfers   [] heat/ice application    [] other:      Other Objective/Functional Measures:   No difficulty with exercises  Prone knee flex is challenging  Initiated low namrata step overs and incline stretch  No complaint of increased pain during session      Pain Level (0-10 scale) post treatment: 4/10    ASSESSMENT/Changes in Function:   Pt is making slow progress toward goals. Pain cont to decrease in the right knee. Cont to report having some difficulty with prolonged standing and walking. Reports continued difficulty with performing some ADLs and household chores.      Patient will continue to benefit from skilled PT services to modify and progress therapeutic interventions, address functional mobility deficits, address ROM deficits, address strength deficits, analyze and cue movement patterns, analyze and modify body mechanics/ergonomics, assess and modify postural abnormalities, address imbalance/dizziness, and instruct in home and community integration to attain remaining goals. [x]  See Plan of Care  []  See progress note/recertification  []  See Discharge Summary         Progress towards goals / Updated goals:  Patient will demonstrate compliance with HEP in order to improve right knee AROM for increased ease of ambulation. Met: compliant 22     Long Term Goals: To be accomplished in 6 weeks:  Goal: Patient will improve FOTO score by 28 points in order to demonstrate a significant improvement in function. Status at evaluation/last progress note: 39 points     2. Goal: Patient will improve right knee AROM 0-120 degrees in order to increase ease of ambulation. Status at evaluation/last progress note: 3-70 degrees  Progressin-92 degrees (22)     3. Goal: Patient will improve right knee ext/flex MMT to 4+/5 in order to increase ease of household chores. Status at evaluation/last progress note: 4-/5     4. Goal: Patient will ambulate community distances without AD in order to return to [facility]. Status at evaluation/last progress note: RW for community ambulation.     PLAN  []  Upgrade activities as tolerated     [x]  Continue plan of care  []  Update interventions per flow sheet       []  Discharge due to:_  []  Other:_      Verbanklauro Matthew, PTA 1/3/2023  6:16 AM    Future Appointments   Date Time Provider Jaycee Grimes   1/3/2023  9:30 AM Phi Goodrich, PTA [facility]   2023 10:30 AM Phi Goodrich PTA [facility]   2023 11:00 AM Phi Goodrich, PTA [facility]   2023 11:00 AM Anil Salcido, PT [facility]   2023 10:30 AM Anil Salcido, PT [facility]   2023  9:30 AM Phi Goodrich, PTA [facility]   2023  9:30 AM Phi Goodrich, PTA [facility]

## 2023-01-05 ENCOUNTER — HOSPITAL ENCOUNTER (OUTPATIENT)
Dept: PHYSICAL THERAPY | Age: 48
Discharge: HOME | End: 2023-01-05
Payer: COMMERCIAL

## 2023-01-05 PROCEDURE — 97110 THERAPEUTIC EXERCISES: CPT

## 2023-01-05 PROCEDURE — 97530 THERAPEUTIC ACTIVITIES: CPT

## 2023-01-05 NOTE — PROGRESS NOTES
[facility] [place] [employer] [facility] [address]  [phone] fax    Physical Therapy Progress Note  Patient name: Kely Riddle Start of Care: 2022   Referral source: PENNY Caputo : 1975                Medical Diagnosis: Right knee pain [M25.561]  Payor: BLUE CROSS / Plan: [address] [place] [address] / Product Type: PPO /  Onset Date: 22                Treatment Diagnosis: right knee pain   Prior Hospitalization: see medical history Provider#: [id number]   Medications: Verified on Patient summary List    Comorbidities: HTN   Prior Level of Function: Ind with ambulation, Ind with ADLs, 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 Patient reports compliance with HEP. Pain level has decreased from 10/10 to 5/10. Strength increased in the right knee for extension to 5/5 and flexion to 4+/5. Patient is no longer walking with AD. AROM in the right knee increased to 0-105 degrees. FOTO score increased from 39 to 65/100 which indicates a good increase in functional ability. Updated Goals: to be achieved in 4 weeks:   Goal: Patient will improve FOTO score by 28 points in order to demonstrate a significant improvement in function. Status at evaluation/last progress note: 65 points     2. Goal: Patient will improve right knee AROM 0-120 degrees in order to increase ease of ambulation. Status at evaluation/last progress note: 0-105 degrees     3. Goal: Patient will improve right knee flex MMT to 5/5 in order to increase ease of household chores. Status at evaluation/last progress note: flex 4+/5 ext 5/5    ASSESSMENT/RECOMMENDATIONS:Patient will continue to benefit from skilled PT services to modify and progress therapeutic interventions, address functional mobility deficits, address ROM deficits, address strength deficits, analyze and cue movement patterns, analyze and modify body mechanics/ergonomics, assess and modify postural abnormalities, address imbalance/dizziness, and instruct in home and community integration to attain remaining goals. [x]Continue therapy per initial plan/protocol at a frequency of  2-3 x per week for 4 weeks  []Continue therapy with the following recommended changes:_____________________      _____________________________________________________________________  []Discontinue therapy progressing towards or have reached established goals  []Discontinue therapy due to lack of appreciable progress towards goals  []Discontinue therapy due to lack of attendance or compliance  []Await Physician's recommendations/decisions regarding therapy  []Other:________________________________________________________________    Thank you for this referral.   Tika Prom, PTA 1/5/2023 6:21 AM    NOTE TO PHYSICIAN:  Denae Reeves 172   [facility] [phone]  If you are unable to process this request in 24 hours please contact our office: [phone]    I have read the above report and request that my patient continue as recommended. I have read the above report and request that my patient continue therapy with the following changes/special instructions:____________________________________  I have read the above report and request that my patient be discharged from therapy.     Physicians signature: ______________________________Date: ______Time:______     PENNY Lin

## 2023-01-05 NOTE — PROGRESS NOTES
PT DAILY TREATMENT NOTE     Patient Name: Alfonso Kyle  Date:2023  : 1975  [x]  Patient  Verified  Payor: BLUE CROSS / Plan: [address] [place] [address] / Product Type: PPO /    In time:1035  Out time:1100  Total Treatment Time (min): 25  Visit #: 7 of     Medicare/BCBS Only   Total Timed Codes (min):  25 1:1 Treatment Time:  25       Treatment Area: Right knee pain [M25.561]    SUBJECTIVE  Pain Level (0-10 scale): 5/10  Any medication changes, allergies to medications, adverse drug reactions, diagnosis change, or new procedure performed?: [x] No    [] Yes (see summary sheet for update)  Subjective functional status/changes:   [] No changes reported  Pt stated that he still has pain    OBJECTIVE    15 min Therapeutic Exercise:  [x] See flow sheet :   Rationale: increase ROM and increase strength to improve the patients ability to increase ease with ADLs    10 min Therapeutic Activity:  [x]  See flow sheet :FOTO and goal assessment   Rationale: increase ROM, increase strength, improve coordination, and increase proprioception  to improve the patients ability to increase ease with ADLs     With   [x] TE   [] TA   [] neuro   [] other: Patient Education: [x] Review HEP    [] Progressed/Changed HEP based on:   [] positioning   [] body mechanics   [] transfers   [] heat/ice application    [] other:      Other Objective/Functional Measures:   FOTO 65     Pain Level (0-10 scale) post treatment: 4/10    ASSESSMENT/Changes in Function:   See progress note    Patient will continue to benefit from skilled PT services to modify and progress therapeutic interventions, address functional mobility deficits, address ROM deficits, address strength deficits, analyze and cue movement patterns, analyze and modify body mechanics/ergonomics, assess and modify postural abnormalities, address imbalance/dizziness, and instruct in home and community integration to attain remaining goals.      []  See Plan of Care  [x]  See progress note/recertification  []  See Discharge Summary         Progress towards goals / Updated goals:  Patient will demonstrate compliance with HEP in order to improve right knee AROM for increased ease of ambulation. Met: compliant 12/12/22     Long Term Goals: To be accomplished in 6 weeks:  Goal: Patient will improve FOTO score by 28 points in order to demonstrate a significant improvement in function. Status at evaluation/last progress note: 39 points  Progressing. Increased from 39 to 65     2. Goal: Patient will improve right knee AROM 0-120 degrees in order to increase ease of ambulation. Status at evaluation/last progress note: 3-70 degrees  Progressing:  AROM is 0-105*     3. Goal: Patient will improve right knee ext/flex MMT to 4+/5 in order to increase ease of household chores. Status at evaluation/last progress note: 4-/5  Goal met. Strength for flex is 4+/5 and ext is 5/5    4. Goal: Patient will ambulate community distances without AD in order to return to [facility]. Status at evaluation/last progress note: RW for community ambulation. Goal met.  Pt no longer uses an AD    PLAN  []  Upgrade activities as tolerated     [x]  Continue plan of care  []  Update interventions per flow sheet       []  Discharge due to:_  []  Other:_      Katrin Johnson PTA 1/5/2023  6:19 AM    Future Appointments   Date Time Provider Jaycee Grimes   1/5/2023 10:30 AM Karole Master, PTA [facility] [address]   1/9/2023 11:00 AM Karole Master, PTA [facility] [address]   1/11/2023 11:00 AM Media Nadine, PT [facility] [address]   1/13/2023 10:30 AM Media Nadine, PT [facility] [address]   1/16/2023  9:30 AM Karole Master, PTA [facility] [address]   1/18/2023  9:30 AM Karole Master, PTA [facility] [address]

## 2023-01-09 ENCOUNTER — HOSPITAL ENCOUNTER (OUTPATIENT)
Dept: PHYSICAL THERAPY | Age: 48
Discharge: HOME | End: 2023-01-09
Payer: COMMERCIAL

## 2023-01-09 PROCEDURE — 97110 THERAPEUTIC EXERCISES: CPT

## 2023-01-09 NOTE — PROGRESS NOTES
PT DAILY TREATMENT NOTE     Patient Name: Danuta Jane  Date:2023  : 1975  [x]  Patient  Verified  Payor: BLUE CROSS / Plan: [address] [place] [address] / Product Type: PPO /    In time:1100  Out time:1130  Total Treatment Time (min): 30  Visit #: 1 of 12    Medicare/BCBS Only   Total Timed Codes (min):  30 1:1 Treatment Time:  30       Treatment Area: Right knee pain [M25.561]    SUBJECTIVE  Pain Level (0-10 scale): 610  Any medication changes, allergies to medications, adverse drug reactions, diagnosis change, or new procedure performed?: [x] No    [] Yes (see summary sheet for update)  Subjective functional status/changes:   [] No changes reported  Pt stated that he did not do much over the weekend and did not do his HEP    OBJECTIVE    30 min Therapeutic Exercise:  [x] See flow sheet :   Rationale: increase ROM and increase strength to improve the patients ability to increase ease with ADLs    With   [x] TE   [] TA   [] neuro   [] other: Patient Education: [x] Review HEP    [] Progressed/Changed HEP based on:   [] positioning   [] body mechanics   [] transfers   [] heat/ice application    [] other:      Other Objective/Functional Measures:   No difficulty with exercises  No complaint of increased ease with ADLs     Pain Level (0-10 scale) post treatment: 4-5/10    ASSESSMENT/Changes in Function:   Pt is making slow progress toward goals. Pt cont with decreased strength and AROM in the right knee. Cont to have moderate limp with ambulation. Cont to report having some difficulty with performing ADLs and household chores.      Patient will continue to benefit from skilled PT services to modify and progress therapeutic interventions, address functional mobility deficits, address ROM deficits, address strength deficits, analyze and cue movement patterns, analyze and modify body mechanics/ergonomics, assess and modify postural abnormalities, address imbalance/dizziness, and instruct in home and community integration to attain remaining goals. []  See Plan of Care  [x]  See progress note/recertification  []  See Discharge Summary         Progress towards goals / Updated goals:              Goal: Patient will improve FOTO score by 28 points in order to demonstrate a significant improvement in function. Status at evaluation/last progress note: 65 points     2. Goal: Patient will improve right knee AROM 0-120 degrees in order to increase ease of ambulation. Status at evaluation/last progress note: 0-105 degrees     3. Goal: Patient will improve right knee flex MMT to 5/5 in order to increase ease of household chores.    Status at evaluation/last progress note: flex 4+/5 ext 5/5    PLAN  []  Upgrade activities as tolerated     [x]  Continue plan of care  []  Update interventions per flow sheet       []  Discharge due to:_  []  Other:_      Abeba Cr PTA 1/9/2023  6:29 AM    Future Appointments   Date Time Provider Jaycee Grimes   1/9/2023 11:00 AM Riana Ovalles PTA [facility]   1/11/2023 11:00 AM Wilhemenia Courser, PT [facility]   1/13/2023 10:30 AM Wilhemenia Courser, PT [facility]   1/16/2023  9:30 AM Riana Ovalles, PTA [facility]   1/18/2023  9:30 AM Riana Ovalles, REMBERTO [facility]

## 2023-01-11 ENCOUNTER — HOSPITAL ENCOUNTER (OUTPATIENT)
Dept: PHYSICAL THERAPY | Age: 48
Discharge: HOME | End: 2023-01-11
Payer: COMMERCIAL

## 2023-01-11 PROCEDURE — 97110 THERAPEUTIC EXERCISES: CPT

## 2023-01-11 PROCEDURE — 97112 NEUROMUSCULAR REEDUCATION: CPT

## 2023-01-11 NOTE — PROGRESS NOTES
PT DAILY TREATMENT NOTE     Patient Name: Jay Rodgers  Date:2023  : 1975  [x]  Patient  Verified  Payor: [email or web address] / Plan: [address] [place] [address] / Product Type: PPO /    In time:***  Out time:***  Total Treatment Time (min): ***  Visit #: 2 of 12    Medicare/BCBS Only   Total Timed Codes (min):  *** 1:1 Treatment Time:  ***       Treatment Area: Right knee pain [M25.561]    SUBJECTIVE  Pain Level (0-10 scale): ***  Any medication changes, allergies to medications, adverse drug reactions, diagnosis change, or new procedure performed?: [x] No    [] Yes (see summary sheet for update)  Subjective functional status/changes:   [] No changes reported  ***    OBJECTIVE    Modality rationale: {[facility] INMOTION MODALITIES:15808} to improve the patients ability to ***   Min Type Additional Details    [] Estim:  []Unatt       []IFC  []Premod                        []Other:  []w/ice   []w/heat  Position:  Location:    [] Estim: []Att    []TENS instruct  []NMES                    []Other:  []w/US   []w/ice   []w/heat  Position:  Location:    []  Traction: [] Cervical       []Lumbar                       [] Prone          []Supine                       []Intermittent   []Continuous Lbs:  [] before manual  [] after manual    []  Ultrasound: []Continuous   [] Pulsed                           []1MHz   []3MHz W/cm2:  Location:    []  Iontophoresis with dexamethasone         Location: [] Take home patch   [] In clinic    []  Ice     []  heat  []  Ice massage  []  Laser   []  Anodyne Position:  Location:    []  Laser with stim  []  Other:  Position:  Location:    []  Vasopneumatic Device    []  Right     []  Left  Pre-treatment girth:  Post-treatment girth:  Measured at (location):  Pressure:       [] lo [] med [] hi   Temperature: [] lo [] med [] hi   [] Skin assessment post-treatment:  []intact []redness- no adverse reaction    []redness - adverse reaction:     *** min Therapeutic Exercise:  [] See flow sheet :   Rationale: {[facility] IMMOTION THER EX:64231:a} to improve the patients ability to ***    *** min Therapeutic Activity:  []  See flow sheet :   Rationale: {[facility] IMMOTION THER EX:04192:a}  to improve the patients ability to ***     *** min Neuromuscular Re-education:  []  See flow sheet :   Rationale: {[facility] IMMOTION THER EX:00379:a}  to improve the patients ability to ***    *** min Manual Therapy:  ***   The manual therapy interventions were performed at a separate and distinct time from the therapeutic activities interventions. Rationale: {[facility] IMMOTION MANUAL THERAPY:73937:a} to ***    *** min Gait Training:  ___ feet with ___ device on level surfaces with ___ level of assist   Rationale:    *** min Self Care/Home Management: ***   Rationale: {[facility] IMMOTION THER EX:06014:a}  to improve the patients ability to ***          With   [] TE   [] TA   [] neuro   [] other: Patient Education: [x] Review HEP    [] Progressed/Changed HEP based on:   [] positioning   [] body mechanics   [] transfers   [] heat/ice application    [] other:      Other Objective/Functional Measures:   Right knee AROM:      Pain Level (0-10 scale) post treatment: ***    ASSESSMENT/Changes in Function: ***    Patient will continue to benefit from skilled PT services to {[facility] INMOTION ASSESSMENT STATEMENTS:92756:a} to attain remaining goals. Progress towards goals / Updated goals:  Goal: Patient will improve FOTO score by 28 points in order to demonstrate a significant improvement in function. Status at evaluation/last progress note: 65 points     2. Goal: Patient will improve right knee AROM 0-120 degrees in order to increase ease of ambulation. Status at evaluation/last progress note: 0-105 degrees     3. Goal: Patient will improve right knee flex MMT to 5/5 in order to increase ease of household chores.    Status at evaluation/last progress note: flex 4+/5 ext 5/5       PLAN  []  Upgrade activities as tolerated     [x] Continue plan of care  []  Update interventions per flow sheet       []  Discharge due to:_  []  Other:_      Janice Moreau, PT 1/11/2023  7:12 AM    Future Appointments   Date Time Provider Jaycee Grimes   1/11/2023 11:00 AM Yulsia Ferris, PT [facility]   1/13/2023 10:30 AM Yulisa Ferris, PT [facility]   1/16/2023  9:30 AM Nuria Nett, PTA [facility]   1/18/2023  9:30 AM Nuria Nett, PTA [facility]

## 2023-01-11 NOTE — PROGRESS NOTES
PT DAILY TREATMENT NOTE     Patient Name: Alena Garcia  Date:2023  : 1975  [x]  Patient  Verified  Payor: [employer] / Plan: [address] [place] [address] / Product Type: PPO /    In time:1230  Out time:100  Total Treatment Time (min): 30  Visit #: 2 of 12    Medicare/BCBS Only   Total Timed Codes (min):  30 1:1 Treatment Time:  30       Treatment Area: Right knee pain [M25.561]    SUBJECTIVE  Pain Level (0-10 scale): 5/10  Any medication changes, allergies to medications, adverse drug reactions, diagnosis change, or new procedure performed?: [x] No    [] Yes (see summary sheet for update)  Subjective functional status/changes:   [] No changes reported  Pt stated that of course he has pain    OBJECTIVE    22 min Therapeutic Exercise:  [x] See flow sheet :   Rationale: increase ROM and increase strength to improve the patients ability to increase ease with ADLs    8 min Neuromuscular Re-education:  [x]  See flow sheet :   Rationale: increase strength, improve coordination, improve balance, and increase proprioception  to improve the patients ability to increase ease with functional activities    With   [x] TE   [] TA   [] neuro   [] other: Patient Education: [x] Review HEP    [] Progressed/Changed HEP based on:   [] positioning   [] body mechanics   [] transfers   [] heat/ice application    [] other:      Other Objective/Functional Measures:   Increased to GTB with hip x 3  Increased step height to 8\" with step ups  No complaint of increased pain during session     Pain Level (0-10 scale) post treatment: 3-4/10    ASSESSMENT/Changes in Function:  Pt is making slow progress toward goals. Strength and AROM are slowly improving in the right knee. Cont to report compliance with HEP. Reported increased ease with performing ADLs and household chores.      Patient will continue to benefit from skilled PT services to modify and progress therapeutic interventions, address functional mobility deficits, address ROM deficits, address strength deficits, analyze and address soft tissue restrictions, analyze and cue movement patterns, analyze and modify body mechanics/ergonomics, assess and modify postural abnormalities, address imbalance/dizziness, and instruct in home and community integration to attain remaining goals. []  See Plan of Care  [x]  See progress note/recertification  []  See Discharge Summary         Progress towards goals / Updated goals:              Goal: Patient will improve FOTO score by 28 points in order to demonstrate a significant improvement in function. Status at evaluation/last progress note: 65 points     2. Goal: Patient will improve right knee AROM 0-120 degrees in order to increase ease of ambulation. Status at evaluation/last progress note: 0-105 degrees     3. Goal: Patient will improve right knee flex MMT to 5/5 in order to increase ease of household chores.    Status at evaluation/last progress note: flex 4+/5 ext 5/5    PLAN  []  Upgrade activities as tolerated     [x]  Continue plan of care  []  Update interventions per flow sheet       []  Discharge due to:_  []  Other:_      Meenakshi Thomas PTA 1/11/2023  12:31 PM    Future Appointments   Date Time Provider Jaycee Grimes   1/13/2023 10:30 AM Cahrli Vick, PT [facility]   1/16/2023  9:30 AM Feroz Ryan PTA [facility]   1/18/2023  9:30 AM Feroz Ryan PTA [facility]

## 2023-01-13 ENCOUNTER — HOSPITAL ENCOUNTER (OUTPATIENT)
Dept: PHYSICAL THERAPY | Age: 48
Discharge: HOME | End: 2023-01-13
Payer: COMMERCIAL

## 2023-01-13 PROCEDURE — 97110 THERAPEUTIC EXERCISES: CPT

## 2023-01-13 PROCEDURE — 97530 THERAPEUTIC ACTIVITIES: CPT

## 2023-01-13 NOTE — PROGRESS NOTES
PT DAILY TREATMENT NOTE     Patient Name: Susie Ramirez  Date:2023  : 1975  [x]  Patient  Verified  Payor: [employer] / Plan: [address] [place] [address] / Product Type: PPO /    In time: 10:33  Out time: 11:13  Total Treatment Time (min): 40  Visit #: 3 of 12    Medicare/BCBS Only   Total Timed Codes (min):  40 1:1 Treatment Time:  40       Treatment Area: Right knee pain [M25.561]    SUBJECTIVE  Pain Level (0-10 scale): 10  Any medication changes, allergies to medications, adverse drug reactions, diagnosis change, or new procedure performed?: [x] No    [] Yes (see summary sheet for update)  Subjective functional status/changes:   [] No changes reported  Pt. Reports he is doing pretty good today. He reports his knee has been easier to bend. OBJECTIVE    30 min Therapeutic Exercise:  [x] See flow sheet :   Rationale: increase ROM and increase strength to improve the patients ability to perform ADLs    10 min Therapeutic Activity:  []  See flow sheet : step ups, eccentric step downs, ROM measurements    Rationale: increase ROM and increase strength  to improve the patients ability to climb stairs           With   [x] TE   [] TA   [] neuro   [] other: Patient Education: [x] Review HEP    [] Progressed/Changed HEP based on:   [] positioning   [] body mechanics   [] transfers   [] heat/ice application    [] other:      Other Objective/Functional Measures:   Right knee AROM: 0-105 degrees  Pt. Tolerated more strengthening exercises well today  He was educated on continuing to focus on stretches at home    Pain Level (0-10 scale) post treatment: 4/10    ASSESSMENT/Changes in Function: pt. Is progressing well with physical therapy. He continues to have decreased right knee flexion mobility but has good extension. His ambulation is improving and he continues to have difficulty with stair negotiation.      Patient will continue to benefit from skilled PT services to modify and progress therapeutic interventions, address functional mobility deficits, address ROM deficits, address strength deficits, analyze and address soft tissue restrictions, analyze and cue movement patterns, analyze and modify body mechanics/ergonomics, and assess and modify postural abnormalities to attain remaining goals. Progress towards goals / Updated goals:              Goal: Patient will improve FOTO score by 28 points in order to demonstrate a significant improvement in function. Status at evaluation/last progress note: 65 points     2. Goal: Patient will improve right knee AROM 0-120 degrees in order to increase ease of ambulation. Status at evaluation/last progress note: 0-105 degrees  Not met: 0-105 degrees (1/13/23)     3. Goal: Patient will improve right knee flex MMT to 5/5 in order to increase ease of household chores.    Status at evaluation/last progress note: flex 4+/5 ext 5/5    PLAN  []  Upgrade activities as tolerated     [x]  Continue plan of care  []  Update interventions per flow sheet       []  Discharge due to:_  []  Other:_      Liane Cline PT 1/13/2023  7:10 AM    Future Appointments   Date Time Provider Jaycee Grimes   1/13/2023 10:30 AM Mildred Orellana PT [facility]   1/16/2023  9:30 AM Anca Sanchez PTA [facility]   1/18/2023  9:30 AM Anca Sanchez PTA [facility]

## 2023-01-16 ENCOUNTER — HOSPITAL ENCOUNTER (OUTPATIENT)
Dept: PHYSICAL THERAPY | Age: 48
Discharge: HOME | End: 2023-01-16
Payer: COMMERCIAL

## 2023-01-16 PROCEDURE — 97530 THERAPEUTIC ACTIVITIES: CPT

## 2023-01-16 PROCEDURE — 97110 THERAPEUTIC EXERCISES: CPT

## 2023-01-16 NOTE — PROGRESS NOTES
PT DAILY TREATMENT NOTE     Patient Name: Chapito Kearney  Date:2023  : 1975  [x]  Patient  Verified  Payor: [employer] / Plan: [address] [place] [address] / Product Type: PPO /    In time:930  Out time:1000  Total Treatment Time (min): 30  Visit #: 4 of 12    Medicare/BCBS Only   Total Timed Codes (min):  30 1:1 Treatment Time:  30       Treatment Area: Right knee pain [M25.561]    SUBJECTIVE  Pain Level (0-10 scale): 5/10  Any medication changes, allergies to medications, adverse drug reactions, diagnosis change, or new procedure performed?: [x] No    [] Yes (see summary sheet for update)  Subjective functional status/changes:   [] No changes reported  Pt stated that he always has pain    OBJECTIVE    20 min Therapeutic Exercise:  [x] See flow sheet :   Rationale: increase ROM and increase strength to improve the patients ability to perform ADLs     10 min Therapeutic Activity:  [x]  See flow sheet : step ups, eccentric step downs, ROM measurements    Rationale: increase ROM and increase strength  to improve the patients ability to climb stairs          With   [x] TE   [] TA   [] neuro   [] other: Patient Education: [x] Review HEP    [] Progressed/Changed HEP based on:   [] positioning   [] body mechanics   [] transfers   [] heat/ice application    [] other:      Other Objective/Functional Measures:   No complaint of increased pain during session   Had some difficulty with monster walks  Increased to GTB with lat and monster walks    Pain Level (0-10 scale) post treatment: 3/10    ASSESSMENT/Changes in Function:   Pt is making slow progress toward goals. Pt cont to report moderate pain in the right knee. Strength and AROm are improving. Pt reports ability to perform stairs with reciprocal pattern. Standing and walking tolerance is improving.      Patient will continue to benefit from skilled PT services to modify and progress therapeutic interventions, address functional mobility deficits, address ROM deficits, address strength deficits, analyze and cue movement patterns, analyze and modify body mechanics/ergonomics, assess and modify postural abnormalities, address imbalance/dizziness, and instruct in home and community integration to attain remaining goals. []  See Plan of Care  [x]  See progress note/recertification  []  See Discharge Summary         Progress towards goals / Updated goals:              Goal: Patient will improve FOTO score by 28 points in order to demonstrate a significant improvement in function. Status at evaluation/last progress note: 65 points     2. Goal: Patient will improve right knee AROM 0-120 degrees in order to increase ease of ambulation. Status at evaluation/last progress note: 0-105 degrees  Not met: 0-105 degrees (1/13/23)     3. Goal: Patient will improve right knee flex MMT to 5/5 in order to increase ease of household chores.    Status at evaluation/last progress note: flex 4+/5 ext 5/5    PLAN  []  Upgrade activities as tolerated     [x]  Continue plan of care  []  Update interventions per flow sheet       []  Discharge due to:_  []  Other:_      Johnathon Day PTA 1/16/2023  6:27 AM    Future Appointments   Date Time Provider Jaycee Grimes   1/16/2023  9:30 AM Jesus Ontiveros PTA [facility] [address]   1/18/2023  9:30 AM Jesus Ontiveros PTA [facility] [address]

## 2023-01-18 ENCOUNTER — APPOINTMENT (OUTPATIENT)
Dept: PHYSICAL THERAPY | Age: 48
End: 2023-01-18
Payer: COMMERCIAL

## 2023-01-18 ENCOUNTER — HOSPITAL ENCOUNTER (OUTPATIENT)
Dept: PHYSICAL THERAPY | Age: 48
Discharge: HOME | End: 2023-01-18
Payer: COMMERCIAL

## 2023-01-18 PROCEDURE — 97110 THERAPEUTIC EXERCISES: CPT

## 2023-01-18 PROCEDURE — 97530 THERAPEUTIC ACTIVITIES: CPT

## 2023-01-18 NOTE — PROGRESS NOTES
PT DAILY TREATMENT NOTE     Patient Name: Abner Juarez  Date:2023  : 1975  [x]  Patient  Verified  Payor: [employer] / Plan: [address] [place] [address] / Product Type: PPO /    In time:100  Out time:135  Total Treatment Time (min): 35  Visit #: 5 of 12    Medicare/BCBS Only   Total Timed Codes (min):  35 1:1 Treatment Time:  35       Treatment Area: Right knee pain [M25.561]    SUBJECTIVE  Pain Level (0-10 scale): 10  Any medication changes, allergies to medications, adverse drug reactions, diagnosis change, or new procedure performed?: [x] No    [] Yes (see summary sheet for update)  Subjective functional status/changes:   [] No changes reported  Pt stated that his knee is feeling pretty good today    OBJECTIVE    25 min Therapeutic Exercise:  [x] See flow sheet :   Rationale: increase ROM and increase strength to improve the patients ability to perform ADLs     10 min Therapeutic Activity:  [x]  See flow sheet : step ups, eccentric step downs, ROM measurements    Rationale: increase ROM and increase strength  to improve the patients ability to climb stairs          With   [x] TE   [] TA   [] neuro   [] other: Patient Education: [x] Review HEP    [] Progressed/Changed HEP based on:   [] positioning   [] body mechanics   [] transfers   [] heat/ice application    [] other:      Other Objective/Functional Measures:   Reported knee popping with leg press and LAQ  No complaint of increased pain during session  Will make changes to program next visit per flow sheet     Pain Level (0-10 scale) post treatment: 3/10    ASSESSMENT/Changes in Function:   Pt is making good progress toward goals. Pain declined a little today. Cont with decreased strength and AROM in the right knee, but is improving.  Standing and walking tolerance is     Patient will continue to benefit from skilled PT services to modify and progress therapeutic interventions, address functional mobility deficits, address ROM deficits, address strength deficits, analyze and cue movement patterns, analyze and modify body mechanics/ergonomics, assess and modify postural abnormalities, address imbalance/dizziness, and instruct in home and community integration to attain remaining goals. []  See Plan of Care  [x]  See progress note/recertification  []  See Discharge Summary         Progress towards goals / Updated goals:              Goal: Patient will improve FOTO score by 28 points in order to demonstrate a significant improvement in function. Status at evaluation/last progress note: 65 points     2. Goal: Patient will improve right knee AROM 0-120 degrees in order to increase ease of ambulation. Status at evaluation/last progress note: 0-105 degrees  Not met: 0-105 degrees (1/13/23)     3. Goal: Patient will improve right knee flex MMT to 5/5 in order to increase ease of household chores.    Status at evaluation/last progress note: flex 4+/5 ext 5/5    PLAN  []  Upgrade activities as tolerated     [x]  Continue plan of care  []  Update interventions per flow sheet       []  Discharge due to:_  []  Other:_      Eloina Juancho, PTA 1/18/2023  6:44 AM    Future Appointments   Date Time Provider Jaycee Grimes   1/18/2023  1:00 PM Arlen Lips, PTA [facility]   1/23/2023  1:00 PM Arlen Lips, PTA [facility]   1/25/2023  1:00 PM Arlen Lips, PTA [facility]   1/30/2023 12:30 PM Tanisha Lund, PT [facility]   2/1/2023  9:00 AM Arlen Lips, PTA [facility]   2/6/2023 10:30 AM Arlen Lips, PTA [facility]   2/8/2023  9:00 AM Arlen Lips, PTA [facility]   2/13/2023 10:30 AM Arlen Lips, PTA [facility]   2/15/2023  9:00 AM Tanisha Lund, PT [facility]

## 2023-01-20 ENCOUNTER — APPOINTMENT (OUTPATIENT)
Dept: PHYSICAL THERAPY | Age: 48
End: 2023-01-20
Payer: COMMERCIAL

## 2023-01-23 ENCOUNTER — HOSPITAL ENCOUNTER (OUTPATIENT)
Dept: PHYSICAL THERAPY | Age: 48
Discharge: HOME | End: 2023-01-23
Payer: COMMERCIAL

## 2023-01-23 PROCEDURE — 97530 THERAPEUTIC ACTIVITIES: CPT

## 2023-01-23 PROCEDURE — 97110 THERAPEUTIC EXERCISES: CPT

## 2023-01-23 NOTE — PROGRESS NOTES
PT DAILY TREATMENT NOTE     Patient Name: Mario Johnson  Date:2023  : 1975  [x]  Patient  Verified  Payor: [employer] / Plan: [address] [place] [address] / Product Type: PPO /    In time:100  Out time:133  Total Treatment Time (min): 33  Visit #: 6 of 12    Medicare/BCBS Only   Total Timed Codes (min):  33 1:1 Treatment Time:  33       Treatment Area: Right knee pain [M25.561]    SUBJECTIVE  Pain Level (0-10 scale): 4/10  Any medication changes, allergies to medications, adverse drug reactions, diagnosis change, or new procedure performed?: [x] No    [] Yes (see summary sheet for update)  Subjective functional status/changes:   [] No changes reported  Pt stated that he is doing a little better today    OBJECTIVE    23 min Therapeutic Exercise:  [x] See flow sheet :   Rationale: increase ROM and increase strength to improve the patients ability to perform ADLs     10 min Therapeutic Activity:  [x]  See flow sheet : step ups, eccentric step downs, ROM measurements    Rationale: increase ROM and increase strength  to improve the patients ability to climb stairs          With   [x] TE   [] TA   [] neuro   [] other: Patient Education: [x] Review HEP    [] Progressed/Changed HEP based on:   [] positioning   [] body mechanics   [] transfers   [] heat/ice application    [] other:      Other Objective/Functional Measures: Made increased today per flow sheet  Will trial LAQ machine next visit     Pain Level (0-10 scale) post treatment: 3/10    ASSESSMENT/Changes in Function:   Pt is making good progress toward goals. Strength and AROM are improving. Cont with slight limp on the right. Cont with decreased standing and walking tolerance.      Patient will continue to benefit from skilled PT services to modify and progress therapeutic interventions, address functional mobility deficits, address ROM deficits, address strength deficits, analyze and address soft tissue restrictions, analyze and cue movement patterns, analyze and modify body mechanics/ergonomics, assess and modify postural abnormalities, address imbalance/dizziness, and instruct in home and community integration to attain remaining goals. []  See Plan of Care  [x]  See progress note/recertification  []  See Discharge Summary         Progress towards goals / Updated goals:              Goal: Patient will improve FOTO score by 28 points in order to demonstrate a significant improvement in function. Status at evaluation/last progress note: 65 points     2. Goal: Patient will improve right knee AROM 0-120 degrees in order to increase ease of ambulation. Status at evaluation/last progress note: 0-105 degrees  Not met: 0-105 degrees (1/13/23)     3. Goal: Patient will improve right knee flex MMT to 5/5 in order to increase ease of household chores.    Status at evaluation/last progress note: flex 4+/5 ext 5/5    PLAN  []  Upgrade activities as tolerated     [x]  Continue plan of care  []  Update interventions per flow sheet       []  Discharge due to:_  []  Other:_      Jordan Bell PTA 1/23/2023  6:46 AM    Future Appointments   Date Time Provider Jaycee Grimes   1/23/2023  1:00 PM Dana Youngblood [facility]   1/25/2023  1:00 PM Malka Morton PTA [facility]   1/30/2023 12:30 PM Alfred De Los Santos, PT [facility]   2/1/2023  9:00 AM Malka Morton PTA [facility]   2/8/2023  9:00 AM Malka Morton PTA [facility]   2/10/2023  9:00 AM Alfred De Los Santos, PT [facility]   2/13/2023 10:30 AM Malka Morton, REMBERTO [facility]   2/15/2023  9:00 AM Alfred De Los Santos, PT [facility]

## 2023-01-25 ENCOUNTER — HOSPITAL ENCOUNTER (OUTPATIENT)
Dept: PHYSICAL THERAPY | Age: 48
Discharge: HOME | End: 2023-01-25
Payer: COMMERCIAL

## 2023-01-25 PROCEDURE — 97110 THERAPEUTIC EXERCISES: CPT

## 2023-01-25 PROCEDURE — 97530 THERAPEUTIC ACTIVITIES: CPT

## 2023-01-25 NOTE — PROGRESS NOTES
PT DAILY TREATMENT NOTE     Patient Name: Elora Eisenmenger  Date:2023  : 1975  [x]  Patient  Verified  Payor: SCARLET Pomaria / Plan: [address] [place] [address] / Product Type: PPO /    In time:100  Out time:130  Total Treatment Time (min): 30  Visit #: 7 of 12    Medicare/BCBS Only   Total Timed Codes (min):  30 1:1 Treatment Time:  30       Treatment Area: Right knee pain [M25.561]    SUBJECTIVE  Pain Level (0-10 scale): 3-4/10  Any medication changes, allergies to medications, adverse drug reactions, diagnosis change, or new procedure performed?: [x] No    [] Yes (see summary sheet for update)  Subjective functional status/changes:   [] No changes reported  Pt stated that he is doing good today and has less pain    OBJECTIVE    22 min Therapeutic Exercise:  [x] See flow sheet :   Rationale: increase ROM and increase strength to improve the patients ability to perform ADLs     8 min Therapeutic Activity:  [x]  See flow sheet : step ups, eccentric step downs, ROM measurements    Rationale: increase ROM and increase strength  to improve the patients ability to climb stairs          With   [x] TE   [] TA   [] neuro   [] other: Patient Education: [x] Review HEP    [] Progressed/Changed HEP based on:   [] positioning   [] body mechanics   [] transfers   [] heat/ice application    [] other:      Other Objective/Functional Measures:   Had no difficulty with exercises  Increased to 6\" step with ecc step downs  Initiated LAQ machine @ 10#     Pain Level (0-10 scale) post treatment: 2/10    ASSESSMENT/Changes in Function:   Pt is making good progress toward goals. Pain cont to decrease. Strength and AROM cont to improve in the right knee. Strength cont to improve.      Patient will continue to benefit from skilled PT services to modify and progress therapeutic interventions, address functional mobility deficits, address ROM deficits, address strength deficits, analyze and cue movement patterns, analyze and modify body mechanics/ergonomics, assess and modify postural abnormalities, address imbalance/dizziness, and instruct in home and community integration to attain remaining goals. []  See Plan of Care  [x]  See progress note/recertification  []  See Discharge Summary         Progress towards goals / Updated goals:              Goal: Patient will improve FOTO score by 28 points in order to demonstrate a significant improvement in function. Status at evaluation/last progress note: 65 points     2. Goal: Patient will improve right knee AROM 0-120 degrees in order to increase ease of ambulation. Status at evaluation/last progress note: 0-105 degrees  Not met: 0-105 degrees (1/13/23)     3. Goal: Patient will improve right knee flex MMT to 5/5 in order to increase ease of household chores.    Status at evaluation/last progress note: flex 4+/5 ext 5/5    PLAN  []  Upgrade activities as tolerated     [x]  Continue plan of care  []  Update interventions per flow sheet       []  Discharge due to:_  []  Other:_      Vianney Canada PTA 1/25/2023  7:00 AM    Future Appointments   Date Time Provider Jaycee Grimes   1/25/2023  1:00 PM Rian Arnold[facility]   1/30/2023 12:30 PM Claudetta Harrier, PT [facility]   2/1/2023  9:00 AM Donwilmer Clarke, PTA [facility]   2/8/2023  9:00 AM Donnammaryann Clarke, PTA [facility]   2/10/2023  9:00 AM Claudetta Harrier, PT [facility]   2/13/2023 10:30 AM Donnammaryann Vince, PTA [facility]   2/15/2023  9:00 AM Claudetta Harrier, PT [facility]

## 2023-01-30 ENCOUNTER — HOSPITAL ENCOUNTER (OUTPATIENT)
Dept: PHYSICAL THERAPY | Age: 48
Discharge: HOME | End: 2023-01-30
Payer: COMMERCIAL

## 2023-01-30 PROCEDURE — 97530 THERAPEUTIC ACTIVITIES: CPT

## 2023-01-30 PROCEDURE — 97140 MANUAL THERAPY 1/> REGIONS: CPT

## 2023-01-30 PROCEDURE — 97110 THERAPEUTIC EXERCISES: CPT

## 2023-01-30 NOTE — THERAPY RECERTIFICATION
[facility]  [address] [facility]  [phone] fax    Physical Therapy Progress Note  Patient name: Heidi Alexander Start of Care: 2022   Referral source: PENNY Castillo : 1975                Medical Diagnosis: Right knee pain [M25.561]  Payor: BLUE CROSS / Plan: [address] [place] [address] / Product Type: PPO /  Onset Date: 22                Treatment Diagnosis: right knee pain   Prior Hospitalization: see medical history Provider#: [id number]   Medications: Verified on Patient summary List    Comorbidities: HTN   Prior Level of Function: Ind with ambulation, Ind with ADLs, manager at [employer]   Visits from [facility]: 15    Missed Visits: 2    Established Goals:         Excellent           Good         Limited           None  [x] Increased ROM   []  [x]  []  []  [x] Increased Strength  []  [x]  []  []  [x] Increased Mobility  []  [x]  []  []   [x] Decreased Pain   []  [x]  []  []  [] Decreased Swelling  []  []  []  []    Key Functional Changes:   Pt. Is progressing with physical therapy but continues to demonstrate decreased right knee AROM 0-105 degrees. His flexion is able to improve to 112 degrees follow manual interventions. He demonstrates good patella mobility. Right knee extension strength was 5/5 and flexion is 4+/5. FOTO score had no significant change at 56 points. He continues to have difficulty with climbing stairs secondary to decreased knee flexion mobility. His gait pattern has improved and he is no longer using an assistive. Skilled PT is medically necessary in order to continue to improve right knee mobility and strength for increased ease of ambulation and return to full work duties. Updated Goals: to be achieved in 4 weeks:  Goal: Patient will improve FOTO score by 28 points in order to demonstrate a significant improvement in function. Status at evaluation/last progress note: 65 points     2. Goal: Patient will improve right knee AROM 0-120 degrees in order to increase ease of ambulation. Status at evaluation/last progress note: 0-105 degrees     3. Goal: Patient will improve right knee flex MMT to 5/5 in order to increase ease of household chores. Status at evaluation/last progress note: flex 4+/5 ext 5/5    ASSESSMENT/RECOMMENDATIONS:  [x]Continue therapy per initial plan/protocol at a frequency of  2-3 x per week for 4 weeks  []Continue therapy with the following recommended changes:_____________________      _____________________________________________________________________  []Discontinue therapy progressing towards or have reached established goals  []Discontinue therapy due to lack of appreciable progress towards goals  []Discontinue therapy due to lack of attendance or compliance  []Await Physician's recommendations/decisions regarding therapy  []Other:________________________________________________________________    Thank you for this referral.   Wyvonne Mortimer, PT 1/30/2023 12:56 PM    NOTE TO PHYSICIAN:  [address]   FAX TO [facility]: ([phone]  If you are unable to process this request in 24 hours please contact our office: [phone]    I have read the above report and request that my patient continue as recommended. I have read the above report and request that my patient continue therapy with the following changes/special instructions:____________________________________  I have read the above report and request that my patient be discharged from therapy.     Physicians signature: ______________________________Date: ______Time:______     PENNY Quijano

## 2023-01-30 NOTE — PROGRESS NOTES
PT DAILY TREATMENT NOTE     Patient Name: Talon Caldwell  Date:2023  : 1975  [x]  Patient  Verified  Payor: BLUE CROSS / Plan: [address] [place] [address] / Product Type: PPO /    In time: 12:32  Out time: 1:10  Total Treatment Time (min): 38  Visit #: 8 of 12    Medicare/BCBS Only   Total Timed Codes (min):  38 1:1 Treatment Time:  38       Treatment Area: Right knee pain [M25.561]    SUBJECTIVE  Pain Level (0-10 scale): 4/10  Any medication changes, allergies to medications, adverse drug reactions, diagnosis change, or new procedure performed?: [x] No    [] Yes (see summary sheet for update)  Subjective functional status/changes:   [] No changes reported  Pt. Reports he is feeling pretty good today. He reports he continues to have stiffness in his knee when climbing stairs. OBJECTIVE    20 min Therapeutic Exercise:  [x] See flow sheet :   Rationale: increase ROM and increase strength to improve the patients ability to perform ADLs    10 min Therapeutic Activity:  []  See flow sheet : re-assessment    Rationale: increase strength and improve coordination  to improve the patients ability to perform ADLs     8 min Manual Therapy:  trigger point release and STM to distal quads   The manual therapy interventions were performed at a separate and distinct time from the therapeutic activities interventions.   Rationale: decrease pain, increase ROM, and increase tissue extensibility to perform ADLs          With   [x] TE   [] TA   [] neuro   [] other: Patient Education: [x] Review HEP    [] Progressed/Changed HEP based on:   [] positioning   [] body mechanics   [] transfers   [] heat/ice application    [] other:      Other Objective/Functional Measures:   Right knee AROM: 0-105 degrees improved to 112 degrees following manual   Right knee MMT ext: 5/5 flex: 4+/5  FOTO: 56 points    Pain Level (0-10 scale) post treatment: 2/10    ASSESSMENT/Changes in Function:     See progress note     Progress towards goals / Updated goals:    See progress note    PLAN  []  Upgrade activities as tolerated     [x]  Continue plan of care  []  Update interventions per flow sheet       []  Discharge due to:_  []  Other:_      Catina Mc, PT 1/30/2023  7:38 AM    Future Appointments   Date Time Provider Jaycee Grimes   1/30/2023 12:30 PM Charli Vick, PT [facility]   2/1/2023  9:00 AM Feroz Connor, PTA [facility]   2/8/2023  9:00 AM Feroz Connor, PTA [facility]   2/10/2023  9:00 AM Charli Vick, PT [facility]   2/13/2023 10:30 AM Feroz Hopkinton, PTA [facility]   2/15/2023  9:00 AM Charli Vick, PT [facility]

## 2023-02-01 ENCOUNTER — HOSPITAL ENCOUNTER (OUTPATIENT)
Dept: PHYSICAL THERAPY | Age: 48
Discharge: HOME | End: 2023-02-01
Payer: COMMERCIAL

## 2023-02-01 PROCEDURE — 97110 THERAPEUTIC EXERCISES: CPT

## 2023-02-01 NOTE — PROGRESS NOTES
PT DAILY TREATMENT NOTE     Patient Name: Danuta Jane  Date:2023  : 1975  [x]  Patient  Verified  Payor: BLUE CROSS / Plan: [address] [place] [address] / Product Type: PPO /    In time:900  Out time:928  Total Treatment Time (min): 28  Visit #: 1 of 12    Medicare/BCBS Only   Total Timed Codes (min):  28 1:1 Treatment Time:  20       Treatment Area: Right knee pain [M25.561]    SUBJECTIVE  Pain Level (0-10 scale): 4/10  Any medication changes, allergies to medications, adverse drug reactions, diagnosis change, or new procedure performed?: [x] No    [] Yes (see summary sheet for update)  Subjective functional status/changes:   [] No changes reported  Pt stated that he is doing better. Has noticed increase ease with walking    OBJECTIVE    20 min Therapeutic Exercise:  [x] See flow sheet :   Rationale: increase ROM and increase strength to improve the patients ability to perform ADLs     8 min Therapeutic Activity:  [x]  See flow sheet : step ups, eccentric step downs, ROM measurements    Rationale: increase ROM and increase strength  to improve the patients ability to climb stairs          With   [x] TE   [] TA   [] neuro   [] other: Patient Education: [x] Review HEP    [] Progressed/Changed HEP based on:   [] positioning   [] body mechanics   [] transfers   [] heat/ice application    [] other:      Other Objective/Functional Measures:   No difficulty with exercises  SL leg machines cont to be challenging   Pt reported doing the massage at home    Pain Level (0-10 scale) post treatment: 2-3/10    ASSESSMENT/Changes in Function:   Pt is making slow steady progress toward goals. Cont with mild to moderate pain in the right knee. Amb cont to improve. Strength and AROM cont to improve in the right knee.      Patient will continue to benefit from skilled PT services to modify and progress therapeutic interventions, address functional mobility deficits, address ROM deficits, address strength deficits, analyze and address soft tissue restrictions, analyze and cue movement patterns, analyze and modify body mechanics/ergonomics, assess and modify postural abnormalities, address imbalance/dizziness, and instruct in home and community integration to attain remaining goals. []  See Plan of Care  [x]  See progress note/recertification  []  See Discharge Summary         Progress towards goals / Updated goals:  Goal: Patient will improve FOTO score by 28 points in order to demonstrate a significant improvement in function. Status at evaluation/last progress note: 65 points     2. Goal: Patient will improve right knee AROM 0-120 degrees in order to increase ease of ambulation. Status at evaluation/last progress note: 0-105 degrees     3. Goal: Patient will improve right knee flex MMT to 5/5 in order to increase ease of household chores.    Status at evaluation/last progress note: flex 4+/5 ext 5/5    PLAN  []  Upgrade activities as tolerated     [x]  Continue plan of care  []  Update interventions per flow sheet       []  Discharge due to:_  []  Other:_      Safia House PTA 2/1/2023  6:24 AM    Future Appointments   Date Time Provider Jaycee Grimes   2/1/2023  9:00 AM Lesa Davenport PTA [facility]   2/8/2023  9:00 AM Lesa Davenport PTA [facility]   2/10/2023  9:00 AM Luis Armando Arango, PT [id number]   2/13/2023 10:30 AM Lesa Davenport PTA [facility]   2/15/2023  9:00 AM Luis Armando Arango, PT [facility]

## 2023-02-06 ENCOUNTER — APPOINTMENT (OUTPATIENT)
Dept: PHYSICAL THERAPY | Age: 48
End: 2023-02-06
Payer: COMMERCIAL

## 2023-02-08 ENCOUNTER — APPOINTMENT (OUTPATIENT)
Dept: PHYSICAL THERAPY | Age: 48
End: 2023-02-08
Payer: COMMERCIAL

## 2023-02-10 ENCOUNTER — APPOINTMENT (OUTPATIENT)
Dept: PHYSICAL THERAPY | Age: 48
End: 2023-02-10
Payer: COMMERCIAL

## 2023-02-13 ENCOUNTER — HOSPITAL ENCOUNTER (OUTPATIENT)
Facility: HOSPITAL | Age: 48
Setting detail: RECURRING SERIES
Discharge: HOME | End: 2023-02-16
Payer: COMMERCIAL

## 2023-02-13 ENCOUNTER — APPOINTMENT (OUTPATIENT)
Dept: PHYSICAL THERAPY | Age: 48
End: 2023-02-13
Payer: COMMERCIAL

## 2023-02-13 PROCEDURE — 97112 NEUROMUSCULAR REEDUCATION: CPT

## 2023-02-13 PROCEDURE — 97110 THERAPEUTIC EXERCISES: CPT

## 2023-02-13 NOTE — PROGRESS NOTES
PHYSICAL / OCCUPATIONAL THERAPY - DAILY TREATMENT NOTE (updated )    Patient Name: Segun Allen    Date: 2023    : 1975  Insurance: Payor: /      Patient  verified yes     Visit #   Current / Total 2 12   Time   In / Out 1030 1055   Pain   In / Out 5/10 3/10   Subjective Functional Status/Changes: Pt stated that he has missed the past 2 weeks due to mother passing away   Changes to:  Meds, Allergies, Med Hx, Sx Hx? If yes, update Summary List no       TREATMENT AREA =  No admission diagnoses are documented for this encounter. OBJECTIVE        Therapeutic Procedures: Tx Min Billable or 1:1 Min (if diff from Tx Min) Procedure, Rationale, Specifics   15  79302 Therapeutic Exercise (timed):  increase ROM, strength, coordination, balance, and proprioception to improve patient's ability to progress to PLOF and address remaining functional goals. (see flow sheet as applicable)     Details if applicable:       10  04872 Neuromuscular Re-Education (timed):  improve balance, coordination, kinesthetic sense, posture, core stability and proprioception to improve patient's ability to develop conscious control of individual muscles and awareness of position of extremities in order to progress to PLOF and address remaining functional goals. (see flow sheet as applicable)     Details if applicable:     22  [facility] Totals Reminder: bill using total billable min of TIMED therapeutic procedures (example: do not include dry needle or estim unattended, both untimed codes, in totals to left)  8-22 min = 1 unit; 23-37 min = 2 units; 38-52 min = 3 units; 53-67 min = 4 units; 68-82 min = 5 units   Total Total     [x]  Patient Education billed concurrently with other procedures   [x] Review HEP    [] Progressed/Changed HEP, detail:    [] Other detail:       Objective Information/Functional Measures/Assessment  Had increased difficulty with step ups using plyo box    Pt is making slow progress toward goals.  Pt reported increased pain today due missing therapy as mother passed away. Cont with decreased strength and AROM in the right knee, but is improving. Cont with moderate pain in the right knee    Patient will continue to benefit from skilled PT / OT services to modify and progress therapeutic interventions, analyze and address functional mobility deficits, analyze and address ROM deficits, analyze and address strength deficits, analyze and cue for proper movement patterns, analyze and modify for postural abnormalities, analyze and address imbalance/dizziness, and instruct in home and community integration to address functional deficits and attain remaining goals. Progress toward goals / Updated goals:  [x]  See Progress Note/Recertification    Goal: Patient will improve FOTO score by 28 points in order to demonstrate a significant improvement in function. Status at evaluation/last progress note: 65 points     2. Goal: Patient will improve right knee AROM 0-120 degrees in order to increase ease of ambulation. Status at evaluation/last progress note: 0-105 degrees     3. Goal: Patient will improve right knee flex MMT to 5/5 in order to increase ease of household chores.    Status at evaluation/last progress note: flex 4+/5 ext 5/5    PLAN  yes Continue plan of care  [x]  Upgrade activities as tolerated  []  Discharge due to :  []  Other:    Kamryn Damon PTA    2/13/2023    7:02 AM    Future Appointments   Date Time Provider Raphael Samson   2/13/2023 10:30 AM Kamryn Damon PTA [facility]   2/15/2023  9:00 AM Vinay Arndt PT [facility]

## 2023-02-15 ENCOUNTER — HOSPITAL ENCOUNTER (OUTPATIENT)
Facility: HOSPITAL | Age: 48
Setting detail: RECURRING SERIES
Discharge: HOME | End: 2023-02-18
Payer: COMMERCIAL

## 2023-02-15 ENCOUNTER — APPOINTMENT (OUTPATIENT)
Dept: PHYSICAL THERAPY | Age: 48
End: 2023-02-15
Payer: COMMERCIAL

## 2023-02-15 PROCEDURE — 97530 THERAPEUTIC ACTIVITIES: CPT

## 2023-02-15 PROCEDURE — 97110 THERAPEUTIC EXERCISES: CPT

## 2023-02-15 NOTE — PROGRESS NOTES
PHYSICAL / OCCUPATIONAL THERAPY - DAILY TREATMENT NOTE (updated )    Patient Name: Eric Nazario    Date: 2/15/2023    : 1975  Insurance: Payor: Sasha Posey / Plan: [address] [place] [address] / Product Type: *No Product type* /      Patient  verified Yes     Visit #   Current / Total 3 12   Time   In / Out 9:04 9:40   Pain   In / Out 3/10 1-2/10   Subjective Functional Status/Changes: Pt states pain has decreased since coming back to therapy due to break after mother passed. Reports using ice due to knee swelling badly over weekend. Wants to be comfortable going back to work. Changes to:  Meds, Allergies, Med Hx, Sx Hx? If yes, update Summary List no       TREATMENT AREA =  Pain in right knee [M25.561]    OBJECTIVE         Therapeutic Procedures: Tx Min Billable or 1:1 Min (if diff from Tx Min) Procedure, Rationale, Specifics   15  33130 Therapeutic Exercise (timed):  increase ROM, strength, coordination, balance, and proprioception to improve patient's ability to progress to PLOF and address remaining functional goals. (see flow sheet as applicable)     Details if applicable:    See flowsheet        16 11 72806 Therapeutic Activity (timed):  use of dynamic activities replicating functional movements to increase ROM, strength, coordination, balance, and proprioception in order to improve patient's ability to progress to PLOF and address remaining functional goals. (see flow sheet as applicable)     Details if applicable:  [place] push/pulls, step ups, eccentric step downs     5 0 Manual Therapy (timed): to increase ROM, strength in order to improve patient's ability to progress to PLOF and address remaining functional goals.   (see flow sheet as applicable)     Details if applicable:    Seated grade 4+ posterior tibial mob          Details if applicable:     36 32 [facility] Totals Reminder: bill using total billable min of TIMED therapeutic procedures (example: do not include dry needle or estim unattended, both untimed codes, in totals to left)  8-22 min = 1 unit; 23-37 min = 2 units; 38-52 min = 3 units; 53-67 min = 4 units; 68-82 min = 5 units   Total Total     [x]  Patient Education billed concurrently with other procedures   [x] Review HEP    [] Progressed/Changed HEP, detail:    [] Other detail:       Objective Information/Functional Measures/Assessment    Right knee AROM: 0-105   Right knee MMT: Flex 5/5, Ext 5/5    Assessment:  Pt showing good progress with continued PT in strength, still with decreased ROM with moderate pain in right knee. MMT of right knee flexion at 5/5, right knee extension 5/5, meeting LTG #3. Right knee AROM remains at 0-105, continued to work on knee flexion stretches and joint mobs. Was able to tolerate weight increases in exercises and functional activities of [place] bar pulls front/ back 25# and Squats with 13# TKE. Patient will continue to benefit from skilled PT / OT services to analyze and address functional mobility deficits, analyze and address ROM deficits, and analyze and address strength deficits to address functional deficits and attain remaining goals. Progress toward goals / Updated goals:  [x]  See Progress Note/Recertification       Goal: Patient will improve FOTO score by 28 points in order to demonstrate a significant improvement in function. Status at evaluation/last progress note: 65 points     2. Goal: Patient will improve right knee AROM 0-120 degrees in order to increase ease of ambulation. Status at evaluation/last progress note: 0-105 degrees  Not met: 0-105 degrees (2/15/23)     3. Goal: Patient will improve right knee flex MMT to 5/5 in order to increase ease of household chores.    Status at evaluation/last progress note: flex 4+/5 ext 5/5  Met 5/5 flex, 5/5 ext (2/15/23)    PLAN  YES Continue plan of care  [x]  Upgrade activities as tolerated  []  Discharge due to :  []  Other:    Ryland Snellen    2/15/2023    7:25 AM    I was present during the entire treatment, directing and participating in the treatment.    Marcus Sales DPT     Future Appointments   Date Time Provider Department Center   2/15/2023  9:00 AM Marcus Sales, PT [facility]   2/20/2023 11:00 AM Jono Lewis, PTA [facility]   2/22/2023 12:30 PM Jono Lewis, PTA [facility]   2/27/2023 11:00 AM Jono Lewis, PTA [facility]   3/2/2023  2:30 PM Marcus Sales, PT [facility]   3/6/2023 12:30 PM Marcus Sales, PT [facility]   3/8/2023 11:00 AM Jono Lewis, PTA [facility]   3/13/2023 11:00 AM Jono Lewis, PTA [facility]   3/15/2023 10:30 AM Jono Lewis, PTA [facility]

## 2023-02-20 ENCOUNTER — HOSPITAL ENCOUNTER (OUTPATIENT)
Facility: HOSPITAL | Age: 48
Setting detail: RECURRING SERIES
Discharge: HOME | End: 2023-02-23
Payer: COMMERCIAL

## 2023-02-20 PROCEDURE — 97110 THERAPEUTIC EXERCISES: CPT

## 2023-02-20 NOTE — PROGRESS NOTES
PHYSICAL / OCCUPATIONAL THERAPY - DAILY TREATMENT NOTE (updated )    Patient Name: Liborio Diego    Date: 2023    : 1975  Insurance: Payor: Patrick Colindres / Plan: [address] [place] [address] / Product Type: *No Product type* /      Patient  verified Yes     Visit #   Current / Total 4 12   Time   In / Out 1100 1132   Pain   In / Out 4/10 2/10   Subjective Functional Status/Changes: Pt stated that he wants to be able to bend the knee more   Changes to:  Meds, Allergies, Med Hx, Sx Hx? If yes, update Summary List no       TREATMENT AREA =  Pain in right knee [M25.561]    OBJECTIVE         Therapeutic Procedures: Tx Min Billable or 1:1 Min (if diff from Tx Min) Procedure, Rationale, Specifics   32  29460 Therapeutic Exercise (timed):  increase ROM, strength, coordination, balance, and proprioception to improve patient's ability to progress to PLOF and address remaining functional goals. (see flow sheet as applicable)     Details if applicable:       28  [facility] Totals Reminder: bill using total billable min of TIMED therapeutic procedures (example: do not include dry needle or estim unattended, both untimed codes, in totals to left)  8-22 min = 1 unit; 23-37 min = 2 units; 38-52 min = 3 units; 53-67 min = 4 units; 68-82 min = 5 units   Total Total     [x]  Patient Education billed concurrently with other procedures   [x] Review HEP    [] Progressed/Changed HEP, detail:    [] Other detail:       Objective Information/Functional Measures/Assessment  Flex range of motion with heel slides 110*  No complaint of increased pain during session   Educated pt on importance of performing heel slides at home to increase flex    Pt is making slow progress toward goals. Pt cont with decreased AROM in the right knee, but is slowly improving. Cont to have decreased standing and walking tolerance. Pt is to return to work 3/19/23.     Patient will continue to benefit from skilled PT / OT services to modify and progress therapeutic interventions, analyze and address functional mobility deficits, analyze and address ROM deficits, analyze and address strength deficits, analyze and address soft tissue restrictions, analyze and cue for proper movement patterns, analyze and modify for postural abnormalities, and instruct in home and community integration to address functional deficits and attain remaining goals. Progress toward goals / Updated goals:  [x]  See Progress Note/Recertification    Goal: Patient will improve FOTO score by 28 points in order to demonstrate a significant improvement in function. Status at evaluation/last progress note: 65 points     2. Goal: Patient will improve right knee AROM 0-120 degrees in order to increase ease of ambulation. Status at evaluation/last progress note: 0-105 degrees  Not met: 0-105 degrees (2/15/23)     3. Goal: Patient will improve right knee flex MMT to 5/5 in order to increase ease of household chores.    Status at evaluation/last progress note: flex 4+/5 ext 5/5  Met 5/5 flex, 5/5 ext (2/15/23)    PLAN  Yes  Continue plan of care  [x]  Upgrade activities as tolerated  []  Discharge due to :  []  Other:    Cooper Prasad PTA    2/20/2023    6:32 AM    Future Appointments   Date Time Provider Raphael Samson   2/20/2023 11:00 AM Cooper Prasad PTA [facility]   2/22/2023 12:30 PM Cooper Prasad PTA [facility]   2/27/2023 11:00 AM Cooper Prasad PTA [facility]   3/2/2023  2:30 PM Norma Beck, PT [facility]   3/6/2023 12:30 PM Norma Beck, PT [facility]   3/8/2023 11:00 AM Cooper Prasad PTA [facility]   3/13/2023 11:00 AM Cooper Prasad PTA [facility]   3/15/2023 10:30 AM Cooper Goodwill, PTA [facility]

## 2023-02-22 ENCOUNTER — HOSPITAL ENCOUNTER (OUTPATIENT)
Facility: HOSPITAL | Age: 48
Setting detail: RECURRING SERIES
Discharge: HOME | End: 2023-02-25
Payer: COMMERCIAL

## 2023-02-22 PROCEDURE — 97110 THERAPEUTIC EXERCISES: CPT

## 2023-02-22 NOTE — PROGRESS NOTES
PHYSICAL / OCCUPATIONAL THERAPY - DAILY TREATMENT NOTE (updated )    Patient Name: Parag Crowley    Date: 2023    : 1975  Insurance: Payor: Desiree Sherwood / Plan: [address] [place] [address] / Product Type: *No Product type* /      Patient  verified Yes     Visit #   Current / Total 5 12   Time   In / Out 1235 105   Pain   In / Out 3/10 1/10   Subjective Functional Status/Changes: Pt stated that he is doing pretty good today   Changes to:  Meds, Allergies, Med Hx, Sx Hx? If yes, update Summary List no       TREATMENT AREA =  Pain in right knee [M25.561]    OBJECTIVE         Therapeutic Procedures: Tx Min Billable or 1:1 Min (if diff from Tx Min) Procedure, Rationale, Specifics   30  36045 Therapeutic Exercise (timed):  increase ROM, strength, coordination, balance, and proprioception to improve patient's ability to progress to PLOF and address remaining functional goals. (see flow sheet as applicable)     Details if applicable:       27  [facility] Totals Reminder: bill using total billable min of TIMED therapeutic procedures (example: do not include dry needle or estim unattended, both untimed codes, in totals to left)  8-22 min = 1 unit; 23-37 min = 2 units; 38-52 min = 3 units; 53-67 min = 4 units; 68-82 min = 5 units   Total Total     [x]  Patient Education billed concurrently with other procedures   [x] Review HEP    [] Progressed/Changed HEP, detail:    [] Other detail:       Objective Information/Functional Measures/Assessment  Had increased ease with plyo step ups  110* flex with heel slides    Pt is making good progress toward goals. Strength and AROM cont to improve in the right knee. Standing and walking tolerance cont to improve.      Patient will continue to benefit from skilled PT / OT services to modify and progress therapeutic interventions, analyze and address functional mobility deficits, analyze and address ROM deficits, analyze and address strength deficits, analyze and address soft tissue restrictions, analyze and cue for proper movement patterns, analyze and modify for postural abnormalities, and instruct in home and community integration to address functional deficits and attain remaining goals. Progress toward goals / Updated goals:  [x]  See Progress Note/Recertification    Goal: Patient will improve FOTO score by 28 points in order to demonstrate a significant improvement in function. Status at evaluation/last progress note: 65 points     2. Goal: Patient will improve right knee AROM 0-120 degrees in order to increase ease of ambulation. Status at evaluation/last progress note: 0-105 degrees  Not met: 0-105 degrees (2/15/23)     3. Goal: Patient will improve right knee flex MMT to 5/5 in order to increase ease of household chores.    Status at evaluation/last progress note: flex 4+/5 ext 5/5  Met 5/5 flex, 5/5 ext (2/15/23)    PLAN  Yes  Continue plan of care  [x]  Upgrade activities as tolerated  []  Discharge due to :  []  Other:    Nitish Eric PTA    2/22/2023    6:21 AM    Future Appointments   Date Time Provider Raphael Samson   2/22/2023 12:30 PM Nitish Eric PTA [facility]   2/27/2023 11:00 AM Nitish Eric PTA [facility]   3/2/2023  2:30 PM Dennis Munoz, PT [facility]   3/6/2023 12:30 PM Dennis Munoz, PT [facility]   3/8/2023 11:00 AM Nitish Eric PTA [facility]   3/13/2023 11:00 AM Nitish Eric PTA [facility]   3/15/2023 10:30 AM Nitish Eric PTA [facility]

## 2023-02-27 ENCOUNTER — HOSPITAL ENCOUNTER (OUTPATIENT)
Facility: HOSPITAL | Age: 48
Setting detail: RECURRING SERIES
Discharge: HOME | End: 2023-03-02
Payer: COMMERCIAL

## 2023-02-27 PROCEDURE — 97110 THERAPEUTIC EXERCISES: CPT

## 2023-02-27 NOTE — PROGRESS NOTES
PHYSICAL / OCCUPATIONAL THERAPY - DAILY TREATMENT NOTE (updated )    Patient Name: Radha Lee    Date: 2023    : 1975  Insurance: Payor: Laith Areas / Plan: [address] [place] [address] / Product Type: *No Product type* /      Patient  verified Yes     Visit #   Current / Total 6 12   Time   In / Out 1100 1134   Pain   In / Out 4/10 1-2/10   Subjective Functional Status/Changes: Pt stated that he had a lot of pain over the weekend   Changes to:  Meds, Allergies, Med Hx, Sx Hx? If yes, update Summary List no       TREATMENT AREA =  Pain in right knee [M25.561]    OBJECTIVE         Therapeutic Procedures: Tx Min Billable or 1:1 Min (if diff from Tx Min) Procedure, Rationale, Specifics   34  05080 Therapeutic Exercise (timed):  increase ROM, strength, coordination, balance, and proprioception to improve patient's ability to progress to PLOF and address remaining functional goals. (see flow sheet as applicable)     Details if applicable:       29  [facility] Totals Reminder: bill using total billable min of TIMED therapeutic procedures (example: do not include dry needle or estim unattended, both untimed codes, in totals to left)  8-22 min = 1 unit; 23-37 min = 2 units; 38-52 min = 3 units; 53-67 min = 4 units; 68-82 min = 5 units   Total Total     [x]  Patient Education billed concurrently with other procedures   [x] Review HEP    [] Progressed/Changed HEP, detail:    [] Other detail:       Objective Information/Functional Measures/Assessment  Increased to 80# with SL leg press  Increased to 15# with TKE and 35# with [address] machine  AROM with heel slides was 0-120* today    Pt is making slow progress toward goals. Cont with decreased flex in the right knee, but is slowly improving. Cont with mild to moderate pain in the knee. Cont with decreased standing and walking tolerance.      Patient will continue to benefit from skilled PT / OT services to modify and progress therapeutic interventions, analyze and address functional mobility deficits, analyze and address ROM deficits, analyze and address strength deficits, analyze and cue for proper movement patterns, analyze and modify for postural abnormalities, and instruct in home and community integration to address functional deficits and attain remaining goals. Progress toward goals / Updated goals:  [x]  See Progress Note/Recertification    Goal: Patient will improve FOTO score by 28 points in order to demonstrate a significant improvement in function. Status at evaluation/last progress note: 65 points     2. Goal: Patient will improve right knee AROM 0-120 degrees in order to increase ease of ambulation. Status at evaluation/last progress note: 0-105 degrees  Not met: 0-105 degrees (2/15/23)     3. Goal: Patient will improve right knee flex MMT to 5/5 in order to increase ease of household chores.    Status at evaluation/last progress note: flex 4+/5 ext 5/5  Met 5/5 flex, 5/5 ext (2/15/23)    PLAN  Yes  Continue plan of care  [x]  Upgrade activities as tolerated  []  Discharge due to :  []  Other:    Misti Stinson PTA    2/27/2023    6:26 AM    Future Appointments   Date Time Provider Raphael Samsno   2/27/2023 11:00 AM Misti Stinson PTA [facility]   3/2/2023  2:30 PM Zonia Herrmann, PT [facility]   3/6/2023 12:30 PM Zonia Herrmann, PT [facility]   3/8/2023 11:00 AM Misti Stinson PTA [facility]   3/13/2023 11:00 AM Mist[facility] Stinson, [facility]TA [facility]   3/15/2023 10:30 AM Misti Stinson, PTA [facility]

## 2023-03-02 ENCOUNTER — HOSPITAL ENCOUNTER (OUTPATIENT)
Facility: HOSPITAL | Age: 48
Setting detail: RECURRING SERIES
End: 2023-03-02
Payer: COMMERCIAL

## 2023-03-06 ENCOUNTER — HOSPITAL ENCOUNTER (OUTPATIENT)
Facility: HOSPITAL | Age: 48
Setting detail: RECURRING SERIES
Discharge: HOME | End: 2023-03-09
Payer: COMMERCIAL

## 2023-03-06 PROCEDURE — 97110 THERAPEUTIC EXERCISES: CPT

## 2023-03-06 NOTE — PROGRESS NOTES
PHYSICAL / OCCUPATIONAL THERAPY - DAILY TREATMENT NOTE (updated )    Patient Name: Nadia Whatley    Date: 3/6/2023    : 1975  Insurance: Payor: David Sunshine 150 / Plan: [address] [place] [address] / Product Type: *No Product type* /      Patient  verified Yes     Visit #   Current / Total 7 12   Time   In / Out 1230 100   Pain   In / Out 3/10 2/10   Subjective Functional Status/Changes: Pt stated he is doing good today, but still has pain   Changes to:  Meds, Allergies, Med Hx, Sx Hx? If yes, update Summary List no       TREATMENT AREA =  Pain in right knee [M25.561]    OBJECTIVE         Therapeutic Procedures: Tx Min Billable or 1:1 Min (if diff from Tx Min) Procedure, Rationale, Specifics   30  56838 Therapeutic Exercise (timed):  increase ROM, strength, coordination, balance, and proprioception to improve patient's ability to progress to PLOF and address remaining functional goals.  (see flow sheet as applicable)     Details if applicable:       27  [facility] Totals Reminder: bill using total billable min of TIMED therapeutic procedures (example: do not include dry needle or estim unattended, both untimed codes, in totals to left)  8-22 min = 1 unit; 23-37 min = 2 units; 38-52 min = 3 units; 53-67 min = 4 units; 68-82 min = 5 units   Total Total     [x]  Patient Education billed concurrently with other procedures   [x] Review HEP    [] Progressed/Changed HEP, detail:    [] Other detail:       Objective Information/Functional Measures/Assessment  No difficulty with exercises  No complaint of increased pain during session    Pt is making good progress toward goals and is to be discharged back to work on 3/15/23 (3 more visits)    Patient will continue to benefit from skilled PT / OT services to modify and progress therapeutic interventions, analyze and address functional mobility deficits, analyze and address ROM deficits, analyze and address strength deficits, analyze and cue for proper movement patterns, analyze and modify for postural abnormalities, and instruct in home and community integration to address functional deficits and attain remaining goals. Progress toward goals / Updated goals:  [x]  See Progress Note/Recertification    Goal: Patient will improve FOTO score by 28 points in order to demonstrate a significant improvement in function. Status at evaluation/last progress note: 65 points     2. Goal: Patient will improve right knee AROM 0-120 degrees in order to increase ease of ambulation. Status at evaluation/last progress note: 0-105 degrees  Not met: 0-105 degrees (2/15/23)     3. Goal: Patient will improve right knee flex MMT to 5/5 in order to increase ease of household chores.    Status at evaluation/last progress note: flex 4+/5 ext 5/5  Met 5/5 flex, 5/5 ext (2/15/23)    PLAN  Yes  Continue plan of care  [x]  Upgrade activities as tolerated  [x]  Discharge in 3 visits  []  Other:    María Elena Hunt PTA    3/6/2023    6:48 AM    Future Appointments   Date Time Provider Raphael Samson   3/6/2023 12:30 PM María Elena Hunt PTA [facility]   3/8/2023 11:00 AM María Elena Hunt PTA [facility]   3/13/2023 11:00 AM María Elena Hunt PTA [facility]   3/15/2023 10:30 AM María Elena Hunt PTA [facility]

## 2023-03-08 ENCOUNTER — HOSPITAL ENCOUNTER (OUTPATIENT)
Facility: HOSPITAL | Age: 48
Setting detail: RECURRING SERIES
Discharge: HOME | End: 2023-03-11
Payer: COMMERCIAL

## 2023-03-08 PROCEDURE — 97110 THERAPEUTIC EXERCISES: CPT

## 2023-03-08 NOTE — PROGRESS NOTES
PHYSICAL / OCCUPATIONAL THERAPY - DAILY TREATMENT NOTE (updated )    Patient Name: Italo Everett    Date: 3/8/2023    : 1975  Insurance: Payor: Isabela Bolivar / Plan: [address] [place] [address] / Product Type: *No Product type* /      Patient  verified Yes     Visit #   Current / Total 8 12   Time   In / Out 1100 1130   Pain   In / Out 4/10 2/10   Subjective Functional Status/Changes: Pt stated that he had a lot of pain yesterday   Changes to:  Meds, Allergies, Med Hx, Sx Hx? If yes, update Summary List no       TREATMENT AREA =  Pain in right knee [M25.561]    OBJECTIVE         Therapeutic Procedures: Tx Min Billable or 1:1 Min (if diff from Tx Min) Procedure, Rationale, Specifics   30  70272 Therapeutic Exercise (timed):  increase ROM, strength, coordination, balance, and proprioception to improve patient's ability to progress to PLOF and address remaining functional goals.  (see flow sheet as applicable)     Details if applicable:       27  [facility] Totals Reminder: bill using total billable min of TIMED therapeutic procedures (example: do not include dry needle or estim unattended, both untimed codes, in totals to left)  8-22 min = 1 unit; 23-37 min = 2 units; 38-52 min = 3 units; 53-67 min = 4 units; 68-82 min = 5 units   Total Total     [x]  Patient Education billed concurrently with other procedures   [x] Review HEP    [] Progressed/Changed HEP, detail:    [] Other detail:       Objective Information/Functional Measures/Assessment  Made increases today per flow sheet  No difficulty with exercises, but reported that they were challenging    Pt is making good progress toward goals and is going to be discharged in 2 visits    Patient will continue to benefit from skilled PT / OT services to modify and progress therapeutic interventions, analyze and address functional mobility deficits, analyze and address ROM deficits, analyze and address strength deficits, analyze and cue for proper movement patterns, analyze and modify for postural abnormalities, analyze and address imbalance/dizziness, and instruct in home and community integration to address functional deficits and attain remaining goals. Progress toward goals / Updated goals:  [x]  See Progress Note/Recertification    Goal: Patient will improve FOTO score by 28 points in order to demonstrate a significant improvement in function. Status at evaluation/last progress note: 65 points     2. Goal: Patient will improve right knee AROM 0-120 degrees in order to increase ease of ambulation. Status at evaluation/last progress note: 0-105 degrees  Not met: 0-105 degrees (2/15/23)     3. Goal: Patient will improve right knee flex MMT to 5/5 in order to increase ease of household chores.    Status at evaluation/last progress note: flex 4+/5 ext 5/5  Met 5/5 flex, 5/5 ext (2/15/23)    PLAN  Yes  Continue plan of care  [x]  Upgrade activities as tolerated  []  Discharge in 2 visits  []  Other:    Rita Apgar, PTA    3/8/2023    6:35 AM    Future Appointments   Date Time Provider Raphael Samson   3/8/2023 11:00 AM Rita Apgar, PTA [facility] [address]   3/13/2023 11:00 AM Nessaa Apgar, PTA [facility] [address]   3/15/2023 10:30 AM Nessa Apgar, PTA [facility] [address]

## 2023-03-13 ENCOUNTER — HOSPITAL ENCOUNTER (OUTPATIENT)
Facility: HOSPITAL | Age: 48
Setting detail: RECURRING SERIES
Discharge: HOME | End: 2023-03-16
Payer: COMMERCIAL

## 2023-03-13 PROCEDURE — 97110 THERAPEUTIC EXERCISES: CPT

## 2023-03-13 NOTE — PROGRESS NOTES
PHYSICAL / OCCUPATIONAL THERAPY - DAILY TREATMENT NOTE (updated )    Patient Name: Liborio Diego    Date: 3/13/2023    : 1975  Insurance: Payor: Patrick Colindres / Plan: [address] [place] [address] / Product Type: *No Product type* /      Patient  verified Yes     Visit #   Current / Total 9 12   Time   In / Out 1100 1130   Pain   In / Out 4/10 2/10   Subjective Functional Status/Changes: Pt stated that his knee is swollen and painful today. Stated that he put ice on it this morning before he came to therapy   Changes to:  Meds, Allergies, Med Hx, Sx Hx? If yes, update Summary List no       TREATMENT AREA =  Pain in right knee [M25.561]    OBJECTIVE         Therapeutic Procedures: Tx Min Billable or 1:1 Min (if diff from Tx Min) Procedure, Rationale, Specifics   30  60733 Therapeutic Exercise (timed):  increase ROM, strength, coordination, balance, and proprioception to improve patient's ability to progress to PLOF and address remaining functional goals.  (see flow sheet as applicable)     Details if applicable:       27  [facility] Totals Reminder: bill using total billable min of TIMED therapeutic procedures (example: do not include dry needle or estim unattended, both untimed codes, in totals to left)  8-22 min = 1 unit; 23-37 min = 2 units; 38-52 min = 3 units; 53-67 min = 4 units; 68-82 min = 5 units   Total Total     [x]  Patient Education billed concurrently with other procedures   [x] Review HEP    [] Progressed/Changed HEP, detail:    [] Other detail:       Objective Information/Functional Measures/Assessment  No difficulty with exercises  115* of flex due to increased swelling    Pt is to be discharged next visit    Patient will continue to benefit from skilled PT / OT services to modify and progress therapeutic interventions, analyze and address functional mobility deficits, analyze and address ROM deficits, analyze and address strength deficits, analyze and cue for proper movement patterns, analyze and modify for postural abnormalities, and instruct in home and community integration to address functional deficits and attain remaining goals. Progress toward goals / Updated goals:  [x]  See Progress Note/Recertification    Goal: Patient will improve FOTO score by 28 points in order to demonstrate a significant improvement in function. Status at evaluation/last progress note: 65 points     2. Goal: Patient will improve right knee AROM 0-120 degrees in order to increase ease of ambulation. Status at evaluation/last progress note: 0-105 degrees  Not met: 0-105 degrees (2/15/23)     3. Goal: Patient will improve right knee flex MMT to 5/5 in order to increase ease of household chores.    Status at evaluation/last progress note: flex 4+/5 ext 5/5  Met 5/5 flex, 5/5 ext (2/15/23)    PLAN  Yes  Continue plan of care  [x]  Upgrade activities as tolerated  []  Discharge due to :  []  Other:    Kori Edwards PTA    3/13/2023    6:23 AM    Future Appointments   Date Time Provider Raphael Samson   3/13/2023 11:00 AM Kori Edwards PTA [facility]   3/15/2023 10:30 AM Kori Edwards PTA [id number]

## 2023-03-15 ENCOUNTER — HOSPITAL ENCOUNTER (OUTPATIENT)
Facility: HOSPITAL | Age: 48
Setting detail: RECURRING SERIES
Discharge: HOME | End: 2023-03-18
Payer: COMMERCIAL

## 2023-03-15 PROCEDURE — 97530 THERAPEUTIC ACTIVITIES: CPT

## 2023-03-15 NOTE — THERAPY DISCHARGE
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 If yes, update Summary List no       TREATMENT AREA =  Pain in right knee [M25.561]    OBJECTIVE         Therapeutic Procedures: Tx Min Billable or 1:1 Min (if diff from Tx Min) Procedure, Rationale, Specifics   8  81322 Therapeutic Activity (timed):  use of dynamic activities replicating functional movements to increase ROM, strength, coordination, balance, and proprioception in order to improve patient's ability to progress to PLOF and address remaining functional goals.   (see flow sheet as applicable)     Details if applicable:       8  MC BC Totals Reminder: bill using total billable min of TIMED therapeutic procedures (example: do not include dry needle or estim unattended, both untimed codes, in totals to left)  8-22 min = 1 unit; 23-37 min = 2 units; 38-52 min = 3 units; 53-67 min = 4 units; 68-82 min = 5 units   Total Total     [x]  Patient Education billed concurrently with other procedures   [x] Review HEP    [] Progressed/Changed HEP, detail:    [] Other detail:       Objective Information/Functional Measures/Assessment  FOTO 67/100  Pt was set up to do another initial FOTO instead of a follow up one, both were closed out    Summary of Care:  Goal: Patient will improve FOTO score by 28 points in order to demonstrate a significant improvement in function. Status at evaluation/last progress note: 65 points  Goal met. Increased from39 to 67/100   2. Goal: Patient will improve right knee AROM 0-120 degrees in order to increase ease of ambulation. Status at evaluation/last progress note: 0-105 degrees  Progressing. Increased to 0-116 degrees   3. Goal: Patient will improve right knee flex MMT to 5/5 in order to increase ease of household chores. Status at evaluation/last progress note: flex 4+/5 ext 5/5  Met 5/5 flex, 5/5 ext    ASSESSMENT/Changes in Function: patient progressed well with therapy. Strength in the right knee increased to 5/5 for flexion and extension. AROM increased to 0-116 degrees. Patient is to join gym to continue to progress with knee AROM.  Patient is to return to work next week per MD approval.     Thank you for this referral!      PLAN  [x]Discontinue therapy: [x]Patient has reached or is progressing toward set goals      []Patient is non-compliant or has abdicated      []Due to lack of appreciable progress towards set goals    Cooper Prasad, ODELL 3/15/2023  6:37 AM

## 2023-03-15 NOTE — PROGRESS NOTES
Physical Therapy Discharge Instructions      [facility]  [address] [place] [address]  [phone] fax    Patient: Caroline Elias  : 1975      Continue Home Exercise Program 2-3 times per day for 4-6 weeks      Continue with    [x] Ice  as needed      [] Heat           Follow up with MD:     [] Upon completion of therapy     [x] As needed      Recommendations:     [x]   Return to activity with home program    [x]   Return to activity with the following modifications:       []Post Rehab Program    []Join Independent aquatic program     [x]Return to/join local gym        Additional Comments:

## 2023-11-08 ENCOUNTER — HOSPITAL ENCOUNTER (INPATIENT)
Facility: HOSPITAL | Age: 48
LOS: 4 days | Discharge: HOME | DRG: 378 | End: 2023-11-12
Attending: STUDENT IN AN ORGANIZED HEALTH CARE EDUCATION/TRAINING PROGRAM | Admitting: STUDENT IN AN ORGANIZED HEALTH CARE EDUCATION/TRAINING PROGRAM
Payer: COMMERCIAL

## 2023-11-08 DIAGNOSIS — K92.2 LOWER GI BLEED: Primary | ICD-10-CM

## 2023-11-08 PROBLEM — D64.9 ANEMIA: Status: ACTIVE | Noted: 2023-11-08

## 2023-11-08 LAB
ALBUMIN SERPL-MCNC: 3.2 G/DL (ref 3.4–5)
ALBUMIN/GLOB SERPL: 1.1 (ref 0.8–1.7)
ALP SERPL-CCNC: 63 U/L (ref 45–117)
ALT SERPL-CCNC: 18 U/L (ref 16–61)
ANION GAP SERPL CALC-SCNC: 2 MMOL/L (ref 3–18)
APTT PPP: 22.3 SEC (ref 23–36.4)
AST SERPL-CCNC: 8 U/L (ref 10–38)
BASOPHILS # BLD: 0 K/UL (ref 0–0.1)
BASOPHILS NFR BLD: 0 % (ref 0–2)
BILIRUB SERPL-MCNC: 0.5 MG/DL (ref 0.2–1)
BUN SERPL-MCNC: 33 MG/DL (ref 7–18)
BUN/CREAT SERPL: 26 (ref 12–20)
CALCIUM SERPL-MCNC: 8.4 MG/DL (ref 8.5–10.1)
CHLORIDE SERPL-SCNC: 109 MMOL/L (ref 100–111)
CO2 SERPL-SCNC: 30 MMOL/L (ref 21–32)
CREAT SERPL-MCNC: 1.27 MG/DL (ref 0.6–1.3)
DIFFERENTIAL METHOD BLD: ABNORMAL
EOSINOPHIL # BLD: 0.1 K/UL (ref 0–0.4)
EOSINOPHIL NFR BLD: 1 % (ref 0–5)
ERYTHROCYTE [DISTWIDTH] IN BLOOD BY AUTOMATED COUNT: 14.1 % (ref 11.6–14.5)
GLOBULIN SER CALC-MCNC: 2.8 G/DL (ref 2–4)
GLUCOSE SERPL-MCNC: 173 MG/DL (ref 74–99)
HCT VFR BLD AUTO: 32.3 % (ref 36–48)
HEMOCCULT STL QL: POSITIVE
HGB BLD-MCNC: 10.6 G/DL (ref 13–16)
IMM GRANULOCYTES # BLD AUTO: 0 K/UL (ref 0–0.04)
IMM GRANULOCYTES NFR BLD AUTO: 0 % (ref 0–0.5)
INR PPP: 1.1 (ref 0.9–1.1)
LYMPHOCYTES # BLD: 2.1 K/UL (ref 0.9–3.6)
LYMPHOCYTES NFR BLD: 21 % (ref 21–52)
MCH RBC QN AUTO: 31 PG (ref 24–34)
MCHC RBC AUTO-ENTMCNC: 32.8 G/DL (ref 31–37)
MCV RBC AUTO: 94.4 FL (ref 78–100)
MONOCYTES # BLD: 0.7 K/UL (ref 0.05–1.2)
MONOCYTES NFR BLD: 7 % (ref 3–10)
NEUTS SEG # BLD: 6.8 K/UL (ref 1.8–8)
NEUTS SEG NFR BLD: 71 % (ref 40–73)
NRBC # BLD: 0 K/UL (ref 0–0.01)
NRBC BLD-RTO: 0 PER 100 WBC
PLATELET # BLD AUTO: 189 K/UL (ref 135–420)
PMV BLD AUTO: 9.3 FL (ref 9.2–11.8)
POTASSIUM SERPL-SCNC: 3.4 MMOL/L (ref 3.5–5.5)
PROT SERPL-MCNC: 6 G/DL (ref 6.4–8.2)
PROTHROMBIN TIME: 13.8 SEC (ref 11.9–14.7)
RBC # BLD AUTO: 3.42 M/UL (ref 4.35–5.65)
SODIUM SERPL-SCNC: 141 MMOL/L (ref 136–145)
TROPONIN I SERPL HS-MCNC: 26 NG/L (ref 0–78)
WBC # BLD AUTO: 9.6 K/UL (ref 4.6–13.2)

## 2023-11-08 PROCEDURE — 99285 EMERGENCY DEPT VISIT HI MDM: CPT

## 2023-11-08 PROCEDURE — 86900 BLOOD TYPING SEROLOGIC ABO: CPT

## 2023-11-08 PROCEDURE — 80053 COMPREHEN METABOLIC PANEL: CPT

## 2023-11-08 PROCEDURE — 82272 OCCULT BLD FECES 1-3 TESTS: CPT

## 2023-11-08 PROCEDURE — 6360000002 HC RX W HCPCS: Performed by: STUDENT IN AN ORGANIZED HEALTH CARE EDUCATION/TRAINING PROGRAM

## 2023-11-08 PROCEDURE — 99222 1ST HOSP IP/OBS MODERATE 55: CPT | Performed by: PHYSICIAN ASSISTANT

## 2023-11-08 PROCEDURE — 96374 THER/PROPH/DIAG INJ IV PUSH: CPT

## 2023-11-08 PROCEDURE — 93005 ELECTROCARDIOGRAM TRACING: CPT | Performed by: STUDENT IN AN ORGANIZED HEALTH CARE EDUCATION/TRAINING PROGRAM

## 2023-11-08 PROCEDURE — 86923 COMPATIBILITY TEST ELECTRIC: CPT

## 2023-11-08 PROCEDURE — 86850 RBC ANTIBODY SCREEN: CPT

## 2023-11-08 PROCEDURE — C9113 INJ PANTOPRAZOLE SODIUM, VIA: HCPCS | Performed by: STUDENT IN AN ORGANIZED HEALTH CARE EDUCATION/TRAINING PROGRAM

## 2023-11-08 PROCEDURE — 85025 COMPLETE CBC W/AUTO DIFF WBC: CPT

## 2023-11-08 PROCEDURE — 86901 BLOOD TYPING SEROLOGIC RH(D): CPT

## 2023-11-08 PROCEDURE — 84484 ASSAY OF TROPONIN QUANT: CPT

## 2023-11-08 PROCEDURE — 85730 THROMBOPLASTIN TIME PARTIAL: CPT

## 2023-11-08 PROCEDURE — 6370000000 HC RX 637 (ALT 250 FOR IP): Performed by: STUDENT IN AN ORGANIZED HEALTH CARE EDUCATION/TRAINING PROGRAM

## 2023-11-08 PROCEDURE — 85610 PROTHROMBIN TIME: CPT

## 2023-11-08 PROCEDURE — 2580000003 HC RX 258: Performed by: STUDENT IN AN ORGANIZED HEALTH CARE EDUCATION/TRAINING PROGRAM

## 2023-11-08 PROCEDURE — A4216 STERILE WATER/SALINE, 10 ML: HCPCS | Performed by: STUDENT IN AN ORGANIZED HEALTH CARE EDUCATION/TRAINING PROGRAM

## 2023-11-08 PROCEDURE — 1100000000 HC RM PRIVATE

## 2023-11-08 RX ORDER — BISACODYL 10 MG
10 SUPPOSITORY, RECTAL RECTAL DAILY PRN
Status: DISCONTINUED | OUTPATIENT
Start: 2023-11-08 | End: 2023-11-12 | Stop reason: HOSPADM

## 2023-11-08 RX ORDER — SODIUM CHLORIDE 0.9 % (FLUSH) 0.9 %
5-40 SYRINGE (ML) INJECTION EVERY 12 HOURS SCHEDULED
Status: DISCONTINUED | OUTPATIENT
Start: 2023-11-08 | End: 2023-11-12 | Stop reason: HOSPADM

## 2023-11-08 RX ORDER — POTASSIUM CHLORIDE 20 MEQ/1
40 TABLET, EXTENDED RELEASE ORAL PRN
Status: DISCONTINUED | OUTPATIENT
Start: 2023-11-08 | End: 2023-11-12 | Stop reason: HOSPADM

## 2023-11-08 RX ORDER — SODIUM CHLORIDE 9 MG/ML
INJECTION, SOLUTION INTRAVENOUS PRN
Status: DISCONTINUED | OUTPATIENT
Start: 2023-11-08 | End: 2023-11-12 | Stop reason: HOSPADM

## 2023-11-08 RX ORDER — MAGNESIUM SULFATE IN WATER 40 MG/ML
2000 INJECTION, SOLUTION INTRAVENOUS PRN
Status: DISCONTINUED | OUTPATIENT
Start: 2023-11-08 | End: 2023-11-12 | Stop reason: HOSPADM

## 2023-11-08 RX ORDER — POLYETHYLENE GLYCOL 3350 17 G/17G
17 POWDER, FOR SOLUTION ORAL DAILY PRN
Status: DISCONTINUED | OUTPATIENT
Start: 2023-11-08 | End: 2023-11-12 | Stop reason: HOSPADM

## 2023-11-08 RX ORDER — HYDRALAZINE HYDROCHLORIDE 20 MG/ML
10 INJECTION INTRAMUSCULAR; INTRAVENOUS EVERY 6 HOURS PRN
Status: DISCONTINUED | OUTPATIENT
Start: 2023-11-08 | End: 2023-11-12 | Stop reason: HOSPADM

## 2023-11-08 RX ORDER — ACETAMINOPHEN 325 MG/1
650 TABLET ORAL EVERY 6 HOURS PRN
Status: DISCONTINUED | OUTPATIENT
Start: 2023-11-08 | End: 2023-11-12 | Stop reason: HOSPADM

## 2023-11-08 RX ORDER — ONDANSETRON 2 MG/ML
4 INJECTION INTRAMUSCULAR; INTRAVENOUS EVERY 6 HOURS PRN
Status: DISCONTINUED | OUTPATIENT
Start: 2023-11-08 | End: 2023-11-12 | Stop reason: HOSPADM

## 2023-11-08 RX ORDER — INSULIN LISPRO 100 [IU]/ML
0-4 INJECTION, SOLUTION INTRAVENOUS; SUBCUTANEOUS
Status: DISCONTINUED | OUTPATIENT
Start: 2023-11-09 | End: 2023-11-12 | Stop reason: HOSPADM

## 2023-11-08 RX ORDER — ONDANSETRON 4 MG/1
4 TABLET, ORALLY DISINTEGRATING ORAL EVERY 8 HOURS PRN
Status: DISCONTINUED | OUTPATIENT
Start: 2023-11-08 | End: 2023-11-12 | Stop reason: HOSPADM

## 2023-11-08 RX ORDER — 0.9 % SODIUM CHLORIDE 0.9 %
1000 INTRAVENOUS SOLUTION INTRAVENOUS ONCE
Status: DISCONTINUED | OUTPATIENT
Start: 2023-11-08 | End: 2023-11-09

## 2023-11-08 RX ORDER — INSULIN LISPRO 100 [IU]/ML
0-4 INJECTION, SOLUTION INTRAVENOUS; SUBCUTANEOUS NIGHTLY
Status: DISCONTINUED | OUTPATIENT
Start: 2023-11-09 | End: 2023-11-12 | Stop reason: HOSPADM

## 2023-11-08 RX ORDER — POTASSIUM CHLORIDE 7.45 MG/ML
10 INJECTION INTRAVENOUS ONCE
Status: COMPLETED | OUTPATIENT
Start: 2023-11-09 | End: 2023-11-09

## 2023-11-08 RX ORDER — ACETAMINOPHEN 500 MG
1000 TABLET ORAL
Status: COMPLETED | OUTPATIENT
Start: 2023-11-08 | End: 2023-11-08

## 2023-11-08 RX ORDER — ENOXAPARIN SODIUM 100 MG/ML
40 INJECTION SUBCUTANEOUS DAILY
Status: DISCONTINUED | OUTPATIENT
Start: 2023-11-09 | End: 2023-11-08

## 2023-11-08 RX ORDER — DEXTROSE MONOHYDRATE 100 MG/ML
INJECTION, SOLUTION INTRAVENOUS CONTINUOUS PRN
Status: DISCONTINUED | OUTPATIENT
Start: 2023-11-08 | End: 2023-11-12 | Stop reason: HOSPADM

## 2023-11-08 RX ORDER — ACETAMINOPHEN 650 MG/1
650 SUPPOSITORY RECTAL EVERY 6 HOURS PRN
Status: DISCONTINUED | OUTPATIENT
Start: 2023-11-08 | End: 2023-11-12 | Stop reason: HOSPADM

## 2023-11-08 RX ORDER — SODIUM CHLORIDE 0.9 % (FLUSH) 0.9 %
5-40 SYRINGE (ML) INJECTION PRN
Status: DISCONTINUED | OUTPATIENT
Start: 2023-11-08 | End: 2023-11-12 | Stop reason: HOSPADM

## 2023-11-08 RX ORDER — POTASSIUM CHLORIDE 7.45 MG/ML
10 INJECTION INTRAVENOUS PRN
Status: DISCONTINUED | OUTPATIENT
Start: 2023-11-08 | End: 2023-11-12 | Stop reason: HOSPADM

## 2023-11-08 RX ORDER — SODIUM CHLORIDE, SODIUM LACTATE, POTASSIUM CHLORIDE, CALCIUM CHLORIDE 600; 310; 30; 20 MG/100ML; MG/100ML; MG/100ML; MG/100ML
INJECTION, SOLUTION INTRAVENOUS CONTINUOUS
Status: DISPENSED | OUTPATIENT
Start: 2023-11-09 | End: 2023-11-10

## 2023-11-08 RX ADMIN — PANTOPRAZOLE SODIUM 40 MG: 40 INJECTION, POWDER, FOR SOLUTION INTRAVENOUS at 21:56

## 2023-11-08 RX ADMIN — SODIUM CHLORIDE 1000 ML: 9 INJECTION, SOLUTION INTRAVENOUS at 21:57

## 2023-11-08 RX ADMIN — ACETAMINOPHEN 1000 MG: 500 TABLET ORAL at 23:51

## 2023-11-08 ASSESSMENT — ENCOUNTER SYMPTOMS
VOMITING: 0
DIARRHEA: 0
BLOOD IN STOOL: 1
ABDOMINAL PAIN: 0
SHORTNESS OF BREATH: 0
NAUSEA: 0
SORE THROAT: 0
CHEST TIGHTNESS: 0

## 2023-11-09 ENCOUNTER — APPOINTMENT (OUTPATIENT)
Facility: HOSPITAL | Age: 48
DRG: 378 | End: 2023-11-09
Payer: COMMERCIAL

## 2023-11-09 LAB
ANION GAP SERPL CALC-SCNC: 4 MMOL/L (ref 3–18)
BASOPHILS # BLD: 0 K/UL (ref 0–0.1)
BASOPHILS NFR BLD: 0 % (ref 0–2)
BUN SERPL-MCNC: 33 MG/DL (ref 7–18)
BUN/CREAT SERPL: 30 (ref 12–20)
CALCIUM SERPL-MCNC: 7.6 MG/DL (ref 8.5–10.1)
CHLORIDE SERPL-SCNC: 110 MMOL/L (ref 100–111)
CO2 SERPL-SCNC: 26 MMOL/L (ref 21–32)
CREAT SERPL-MCNC: 1.09 MG/DL (ref 0.6–1.3)
DIFFERENTIAL METHOD BLD: ABNORMAL
EKG ATRIAL RATE: 114 BPM
EKG DIAGNOSIS: NORMAL
EKG P AXIS: 46 DEGREES
EKG P-R INTERVAL: 142 MS
EKG Q-T INTERVAL: 336 MS
EKG QRS DURATION: 80 MS
EKG QTC CALCULATION (BAZETT): 463 MS
EKG R AXIS: 34 DEGREES
EKG T AXIS: 43 DEGREES
EKG VENTRICULAR RATE: 114 BPM
EOSINOPHIL # BLD: 0 K/UL (ref 0–0.4)
EOSINOPHIL NFR BLD: 0 % (ref 0–5)
ERYTHROCYTE [DISTWIDTH] IN BLOOD BY AUTOMATED COUNT: 14.2 % (ref 11.6–14.5)
EST. AVERAGE GLUCOSE BLD GHB EST-MCNC: 105 MG/DL
GLUCOSE BLD STRIP.AUTO-MCNC: 116 MG/DL (ref 70–110)
GLUCOSE BLD STRIP.AUTO-MCNC: 121 MG/DL (ref 70–110)
GLUCOSE BLD STRIP.AUTO-MCNC: 128 MG/DL (ref 70–110)
GLUCOSE BLD STRIP.AUTO-MCNC: 133 MG/DL (ref 70–110)
GLUCOSE BLD STRIP.AUTO-MCNC: 136 MG/DL (ref 70–110)
GLUCOSE SERPL-MCNC: 122 MG/DL (ref 74–99)
HBA1C MFR BLD: 5.3 % (ref 4.2–5.6)
HCT VFR BLD AUTO: 19.4 % (ref 36–48)
HCT VFR BLD AUTO: 20.1 % (ref 36–48)
HCT VFR BLD AUTO: 24.5 % (ref 36–48)
HGB BLD-MCNC: 6.5 G/DL (ref 13–16)
HGB BLD-MCNC: 6.7 G/DL (ref 13–16)
HGB BLD-MCNC: 8.3 G/DL (ref 13–16)
HISTORY CHECK: NORMAL
IMM GRANULOCYTES # BLD AUTO: 0 K/UL (ref 0–0.04)
IMM GRANULOCYTES NFR BLD AUTO: 0 % (ref 0–0.5)
LYMPHOCYTES # BLD: 1.6 K/UL (ref 0.9–3.6)
LYMPHOCYTES NFR BLD: 22 % (ref 21–52)
MCH RBC QN AUTO: 31.3 PG (ref 24–34)
MCHC RBC AUTO-ENTMCNC: 33.9 G/DL (ref 31–37)
MCV RBC AUTO: 92.5 FL (ref 78–100)
MONOCYTES # BLD: 0.6 K/UL (ref 0.05–1.2)
MONOCYTES NFR BLD: 8 % (ref 3–10)
NEUTS SEG # BLD: 5.1 K/UL (ref 1.8–8)
NEUTS SEG NFR BLD: 69 % (ref 40–73)
NRBC # BLD: 0 K/UL (ref 0–0.01)
NRBC BLD-RTO: 0 PER 100 WBC
PLATELET # BLD AUTO: 159 K/UL (ref 135–420)
PMV BLD AUTO: 9.5 FL (ref 9.2–11.8)
POTASSIUM SERPL-SCNC: 4 MMOL/L (ref 3.5–5.5)
RBC # BLD AUTO: 2.65 M/UL (ref 4.35–5.65)
SODIUM SERPL-SCNC: 140 MMOL/L (ref 136–145)
WBC # BLD AUTO: 7.4 K/UL (ref 4.6–13.2)

## 2023-11-09 PROCEDURE — 6360000002 HC RX W HCPCS: Performed by: PHYSICIAN ASSISTANT

## 2023-11-09 PROCEDURE — 6360000004 HC RX CONTRAST MEDICATION: Performed by: PHYSICIAN ASSISTANT

## 2023-11-09 PROCEDURE — 94761 N-INVAS EAR/PLS OXIMETRY MLT: CPT

## 2023-11-09 PROCEDURE — 36415 COLL VENOUS BLD VENIPUNCTURE: CPT

## 2023-11-09 PROCEDURE — 85014 HEMATOCRIT: CPT

## 2023-11-09 PROCEDURE — 85025 COMPLETE CBC W/AUTO DIFF WBC: CPT

## 2023-11-09 PROCEDURE — 30233N1 TRANSFUSION OF NONAUTOLOGOUS RED BLOOD CELLS INTO PERIPHERAL VEIN, PERCUTANEOUS APPROACH: ICD-10-PCS | Performed by: INTERNAL MEDICINE

## 2023-11-09 PROCEDURE — 74174 CTA ABD&PLVS W/CONTRAST: CPT

## 2023-11-09 PROCEDURE — A4216 STERILE WATER/SALINE, 10 ML: HCPCS | Performed by: PHYSICIAN ASSISTANT

## 2023-11-09 PROCEDURE — 93010 ELECTROCARDIOGRAM REPORT: CPT | Performed by: INTERNAL MEDICINE

## 2023-11-09 PROCEDURE — 99232 SBSQ HOSP IP/OBS MODERATE 35: CPT | Performed by: INTERNAL MEDICINE

## 2023-11-09 PROCEDURE — 82962 GLUCOSE BLOOD TEST: CPT

## 2023-11-09 PROCEDURE — 83036 HEMOGLOBIN GLYCOSYLATED A1C: CPT

## 2023-11-09 PROCEDURE — 78278 ACUTE GI BLOOD LOSS IMAGING: CPT

## 2023-11-09 PROCEDURE — 6370000000 HC RX 637 (ALT 250 FOR IP): Performed by: PHYSICIAN ASSISTANT

## 2023-11-09 PROCEDURE — C9113 INJ PANTOPRAZOLE SODIUM, VIA: HCPCS | Performed by: PHYSICIAN ASSISTANT

## 2023-11-09 PROCEDURE — P9016 RBC LEUKOCYTES REDUCED: HCPCS

## 2023-11-09 PROCEDURE — 2580000003 HC RX 258: Performed by: PHYSICIAN ASSISTANT

## 2023-11-09 PROCEDURE — 85018 HEMOGLOBIN: CPT

## 2023-11-09 PROCEDURE — 80048 BASIC METABOLIC PNL TOTAL CA: CPT

## 2023-11-09 PROCEDURE — 36430 TRANSFUSION BLD/BLD COMPNT: CPT

## 2023-11-09 PROCEDURE — 2140000001 HC CVICU INTERMEDIATE R&B

## 2023-11-09 RX ORDER — SODIUM CHLORIDE 9 MG/ML
INJECTION, SOLUTION INTRAVENOUS PRN
Status: DISCONTINUED | OUTPATIENT
Start: 2023-11-09 | End: 2023-11-12 | Stop reason: HOSPADM

## 2023-11-09 RX ORDER — HEPARIN SODIUM (PORCINE) LOCK FLUSH IV SOLN 100 UNIT/ML 100 UNIT/ML
500 SOLUTION INTRAVENOUS PRN
Status: DISCONTINUED | OUTPATIENT
Start: 2023-11-09 | End: 2023-11-10 | Stop reason: HOSPADM

## 2023-11-09 RX ADMIN — SODIUM CHLORIDE, PRESERVATIVE FREE 10 ML: 5 INJECTION INTRAVENOUS at 00:42

## 2023-11-09 RX ADMIN — SODIUM CHLORIDE, PRESERVATIVE FREE 10 ML: 5 INJECTION INTRAVENOUS at 08:48

## 2023-11-09 RX ADMIN — SODIUM CHLORIDE, POTASSIUM CHLORIDE, SODIUM LACTATE AND CALCIUM CHLORIDE: 600; 310; 30; 20 INJECTION, SOLUTION INTRAVENOUS at 00:35

## 2023-11-09 RX ADMIN — SODIUM CHLORIDE 40 MG: 9 INJECTION INTRAMUSCULAR; INTRAVENOUS; SUBCUTANEOUS at 21:45

## 2023-11-09 RX ADMIN — Medication 27.5 MILLICURIE: at 16:35

## 2023-11-09 RX ADMIN — ACETAMINOPHEN 325MG 650 MG: 325 TABLET ORAL at 02:13

## 2023-11-09 RX ADMIN — IOPAMIDOL 100 ML: 755 INJECTION, SOLUTION INTRAVENOUS at 10:28

## 2023-11-09 RX ADMIN — SODIUM CHLORIDE 40 MG: 9 INJECTION INTRAMUSCULAR; INTRAVENOUS; SUBCUTANEOUS at 08:43

## 2023-11-09 RX ADMIN — POTASSIUM CHLORIDE 10 MEQ: 7.46 INJECTION, SOLUTION INTRAVENOUS at 00:48

## 2023-11-09 RX ADMIN — ACETAMINOPHEN 325MG 650 MG: 325 TABLET ORAL at 18:54

## 2023-11-09 ASSESSMENT — PAIN - FUNCTIONAL ASSESSMENT: PAIN_FUNCTIONAL_ASSESSMENT: ACTIVITIES ARE NOT PREVENTED

## 2023-11-09 ASSESSMENT — PAIN DESCRIPTION - DESCRIPTORS
DESCRIPTORS: DISCOMFORT
DESCRIPTORS: ACHING
DESCRIPTORS: ACHING

## 2023-11-09 ASSESSMENT — PAIN SCALES - GENERAL
PAINLEVEL_OUTOF10: 6
PAINLEVEL_OUTOF10: 0
PAINLEVEL_OUTOF10: 3
PAINLEVEL_OUTOF10: 3
PAINLEVEL_OUTOF10: 0
PAINLEVEL_OUTOF10: 5

## 2023-11-09 ASSESSMENT — PAIN DESCRIPTION - LOCATION
LOCATION: HEAD

## 2023-11-09 ASSESSMENT — PAIN DESCRIPTION - ORIENTATION
ORIENTATION: ANTERIOR

## 2023-11-09 NOTE — H&P
History and Physical          Subjective     HPI: Yoana Perry is a 51 y.o. male with a PMHx of asthma, HTN who presented to the ED with c/o generalized weakness and BRBPR that started today. He's had 5 episodes so far, on the way to the bathroom for the 6th. No prior similar sx. Nothing makes it better or worse. He states blood is dark red with clots and fills the toilet bowl. No abdominal pain except mild cramping just prior to bm. No family hx of same. He denies sob, cp, NV. He has never had a colonoscopy. In the ED, , /106. Labs with K 3.4, , hgb 10.6. PMHx:  Past Medical History:   Diagnosis Date    Asthma     Genital warts     History of RPR test 2/26/2015    positive    Hypertension     Right knee pain        PSurgHx:  No past surgical history on file. SocialHx:  Social History     Socioeconomic History    Marital status: Single   Tobacco Use    Smoking status: Every Day     Packs/day: .25     Types: Cigarettes    Smokeless tobacco: Never   Substance and Sexual Activity    Alcohol use: Yes     Alcohol/week: 6.0 standard drinks of alcohol    Drug use: Never       FamilyHx:  Family History   Problem Relation Age of Onset    Lung Disease Mother        Home Medications:  Prior to Admission medications    Medication Sig Start Date End Date Taking? Authorizing Provider   acetaminophen (TYLENOL) 500 MG tablet Take 1,000 mg by mouth in the morning and 1,000 mg at noon and 1,000 mg in the evening and 1,000 mg before bedtime.  12/10/20   Automatic Reconciliation, Ar   albuterol sulfate HFA (PROVENTIL;VENTOLIN;PROAIR) 108 (90 Base) MCG/ACT inhaler Inhale into the lungs    Automatic Reconciliation, Ar   amLODIPine (NORVASC) 10 MG tablet Take 10 mg by mouth daily    Automatic Reconciliation, Ar   aspirin 325 MG EC tablet Take 325 mg by mouth 2 times daily 12/10/20   Automatic Reconciliation, Ar   vitamin D (CHOLECALCIFEROL) 70277 UNIT CAPS Take 50,000 Units by mouth every 7 days 12/10/20

## 2023-11-09 NOTE — ED TRIAGE NOTES
Pt arrive via ems c/o bloody stool. Pt states he had three bowel movement with blood. Pt states he has been having headaches and has not been taking BP medication for two weeks. A&Ox4. Ambulatory to bedside.

## 2023-11-09 NOTE — PROGRESS NOTES
1615: Report received from SALAS Burr from [facility] to [facility] for routine progression of care. 1800: Patient arrived to unit from [facility] via bed accompanied by RN. Patient's, stable, and verbal. No noted signs of distress. Will continue to monitor. 1820: Dual skin check completed with Javier Anguiano RN. Patient's skin is C/D/I with a right knee replacement scar. 1826: Patient ambulated to restroom with assistance from nurse. Patient had a small  soft bowel movement. No bright red blood noted. 1945: Bedside shift change report given to Emmie Pickering RN (oncoming nurse) by Denisse Wallace RN (offgoing nurse). Report included the following information Nurse Handoff Report, Adult Overview, Intake/Output, MAR, and Cardiac Rhythm NSR .

## 2023-11-09 NOTE — CONSENT
Informed Consent for Blood Component Transfusion Note    I have discussed with the patient the rationale for blood component transfusion; its benefits in treating or preventing fatigue, organ damage, or death; and its risk which includes mild transfusion reactions, rare risk of blood borne infection, or more serious but rare reactions. I have discussed the alternatives to transfusion, including the risk and consequences of not receiving transfusion. The patient had an opportunity to ask questions and had agreed to proceed with transfusion of blood components.     Electronically signed by MARLENY Gilliam on 11/8/23 at 11:54 PM EST

## 2023-11-09 NOTE — PROGRESS NOTES
1756: Patient transported to unit from [facility] accompanied by nurse. Blood transfusion's still infusing. Patient's alert, verbal, and resting securely in bed. No signs of distress noted.

## 2023-11-09 NOTE — ED PROVIDER NOTES
EMERGENCY DEPARTMENT HISTORY AND PHYSICAL EXAM      Date: 11/8/2023  Patient Name: Radha Edgar    History of Presenting Illness     Chief Complaint   Patient presents with    GI Problem       50-year-old male with past medical history of hypertension presenting to the emergency department for evaluation of generalized weakness and blood per rectum. Patient states that he has been feeling weak all day. Earlier this evening at work he had 4 separate episodes of bloody bowel movements. Patient denies any history of GI bleed. Denies being on any blood thinners. Denies any complaints otherwise. PCP: Dwain Boyle MD    Current Facility-Administered Medications   Medication Dose Route Frequency Provider Last Rate Last Admin    sodium chloride 0.9 % bolus 1,000 mL  1,000 mL IntraVENous Once [facility], .9 mL/hr at 11/08/23 2157 1,000 mL at 11/08/23 2157     Current Outpatient Medications   Medication Sig Dispense Refill    acetaminophen (TYLENOL) 500 MG tablet Take 1,000 mg by mouth in the morning and 1,000 mg at noon and 1,000 mg in the evening and 1,000 mg before bedtime. albuterol sulfate HFA (PROVENTIL;VENTOLIN;PROAIR) 108 (90 Base) MCG/ACT inhaler Inhale into the lungs      amLODIPine (NORVASC) 10 MG tablet Take 10 mg by mouth daily      aspirin 325 MG EC tablet Take 325 mg by mouth 2 times daily      vitamin D (CHOLECALCIFEROL) 39757 UNIT CAPS Take 50,000 Units by mouth every 7 days      losartan-hydroCHLOROthiazide (HYZAAR) 100-25 MG per tablet Take 1 tablet by mouth daily         Past History     Past Medical History:  Past Medical History:   Diagnosis Date    Asthma     Genital warts     History of RPR test 2/26/2015    positive    Hypertension     Right knee pain        Past Surgical History:  No past surgical history on file.     Family History:  Family History   Problem Relation Age of Onset    Lung Disease Mother        Social History:  Social History     Tobacco Use

## 2023-11-10 ENCOUNTER — APPOINTMENT (OUTPATIENT)
Facility: HOSPITAL | Age: 48
DRG: 378 | End: 2023-11-10
Payer: COMMERCIAL

## 2023-11-10 ENCOUNTER — ANESTHESIA EVENT (OUTPATIENT)
Facility: HOSPITAL | Age: 48
End: 2023-11-10
Payer: COMMERCIAL

## 2023-11-10 ENCOUNTER — ANESTHESIA (OUTPATIENT)
Facility: HOSPITAL | Age: 48
End: 2023-11-10
Payer: COMMERCIAL

## 2023-11-10 PROBLEM — D62 ACUTE BLOOD LOSS ANEMIA: Status: ACTIVE | Noted: 2023-11-10

## 2023-11-10 LAB
ANION GAP SERPL CALC-SCNC: 3 MMOL/L (ref 3–18)
BASOPHILS # BLD: 0 K/UL (ref 0–0.1)
BASOPHILS NFR BLD: 0 % (ref 0–2)
BUN SERPL-MCNC: 28 MG/DL (ref 7–18)
BUN/CREAT SERPL: 32 (ref 12–20)
CALCIUM SERPL-MCNC: 7.5 MG/DL (ref 8.5–10.1)
CHLORIDE SERPL-SCNC: 111 MMOL/L (ref 100–111)
CO2 SERPL-SCNC: 29 MMOL/L (ref 21–32)
CREAT SERPL-MCNC: 0.87 MG/DL (ref 0.6–1.3)
DIFFERENTIAL METHOD BLD: ABNORMAL
EOSINOPHIL # BLD: 0 K/UL (ref 0–0.4)
EOSINOPHIL NFR BLD: 1 % (ref 0–5)
ERYTHROCYTE [DISTWIDTH] IN BLOOD BY AUTOMATED COUNT: 14.9 % (ref 11.6–14.5)
GLUCOSE BLD STRIP.AUTO-MCNC: 108 MG/DL (ref 70–110)
GLUCOSE BLD STRIP.AUTO-MCNC: 119 MG/DL (ref 70–110)
GLUCOSE BLD STRIP.AUTO-MCNC: 138 MG/DL (ref 70–110)
GLUCOSE BLD STRIP.AUTO-MCNC: 155 MG/DL (ref 70–110)
GLUCOSE SERPL-MCNC: 113 MG/DL (ref 74–99)
HCT VFR BLD AUTO: 18.2 % (ref 36–48)
HCT VFR BLD AUTO: 23.2 % (ref 36–48)
HGB BLD-MCNC: 6.1 G/DL (ref 13–16)
HGB BLD-MCNC: 7.9 G/DL (ref 13–16)
HISTORY CHECK: NORMAL
HISTORY CHECK: NORMAL
IMM GRANULOCYTES # BLD AUTO: 0 K/UL (ref 0–0.04)
IMM GRANULOCYTES NFR BLD AUTO: 1 % (ref 0–0.5)
LYMPHOCYTES # BLD: 1.5 K/UL (ref 0.9–3.6)
LYMPHOCYTES NFR BLD: 25 % (ref 21–52)
MCH RBC QN AUTO: 30.9 PG (ref 24–34)
MCHC RBC AUTO-ENTMCNC: 34.1 G/DL (ref 31–37)
MCV RBC AUTO: 90.6 FL (ref 78–100)
MONOCYTES # BLD: 0.5 K/UL (ref 0.05–1.2)
MONOCYTES NFR BLD: 8 % (ref 3–10)
NEUTS SEG # BLD: 3.9 K/UL (ref 1.8–8)
NEUTS SEG NFR BLD: 65 % (ref 40–73)
NRBC # BLD: 0 K/UL (ref 0–0.01)
NRBC BLD-RTO: 0 PER 100 WBC
PLATELET # BLD AUTO: 127 K/UL (ref 135–420)
PMV BLD AUTO: 10.1 FL (ref 9.2–11.8)
POTASSIUM SERPL-SCNC: 3.6 MMOL/L (ref 3.5–5.5)
RBC # BLD AUTO: 2.56 M/UL (ref 4.35–5.65)
SODIUM SERPL-SCNC: 143 MMOL/L (ref 136–145)
WBC # BLD AUTO: 6 K/UL (ref 4.6–13.2)

## 2023-11-10 PROCEDURE — P9016 RBC LEUKOCYTES REDUCED: HCPCS

## 2023-11-10 PROCEDURE — 85014 HEMATOCRIT: CPT

## 2023-11-10 PROCEDURE — 7100000000 HC PACU RECOVERY - FIRST 15 MIN: Performed by: INTERNAL MEDICINE

## 2023-11-10 PROCEDURE — 36415 COLL VENOUS BLD VENIPUNCTURE: CPT

## 2023-11-10 PROCEDURE — 2580000003 HC RX 258: Performed by: PHYSICIAN ASSISTANT

## 2023-11-10 PROCEDURE — 2580000003 HC RX 258: Performed by: STUDENT IN AN ORGANIZED HEALTH CARE EDUCATION/TRAINING PROGRAM

## 2023-11-10 PROCEDURE — 3600007502: Performed by: INTERNAL MEDICINE

## 2023-11-10 PROCEDURE — 6360000002 HC RX W HCPCS: Performed by: NURSE ANESTHETIST, CERTIFIED REGISTERED

## 2023-11-10 PROCEDURE — 7100000010 HC PHASE II RECOVERY - FIRST 15 MIN: Performed by: INTERNAL MEDICINE

## 2023-11-10 PROCEDURE — 36430 TRANSFUSION BLD/BLD COMPNT: CPT

## 2023-11-10 PROCEDURE — 6370000000 HC RX 637 (ALT 250 FOR IP): Performed by: PHYSICIAN ASSISTANT

## 2023-11-10 PROCEDURE — A4216 STERILE WATER/SALINE, 10 ML: HCPCS | Performed by: PHYSICIAN ASSISTANT

## 2023-11-10 PROCEDURE — 2500000003 HC RX 250 WO HCPCS: Performed by: NURSE ANESTHETIST, CERTIFIED REGISTERED

## 2023-11-10 PROCEDURE — 6360000002 HC RX W HCPCS: Performed by: PHYSICIAN ASSISTANT

## 2023-11-10 PROCEDURE — 6360000004 HC RX CONTRAST MEDICATION: Performed by: INTERNAL MEDICINE

## 2023-11-10 PROCEDURE — 85025 COMPLETE CBC W/AUTO DIFF WBC: CPT

## 2023-11-10 PROCEDURE — 82962 GLUCOSE BLOOD TEST: CPT

## 2023-11-10 PROCEDURE — 85018 HEMOGLOBIN: CPT

## 2023-11-10 PROCEDURE — 2140000001 HC CVICU INTERMEDIATE R&B

## 2023-11-10 PROCEDURE — 74174 CTA ABD&PLVS W/CONTRAST: CPT

## 2023-11-10 PROCEDURE — 3700000000 HC ANESTHESIA ATTENDED CARE: Performed by: INTERNAL MEDICINE

## 2023-11-10 PROCEDURE — 99232 SBSQ HOSP IP/OBS MODERATE 35: CPT | Performed by: INTERNAL MEDICINE

## 2023-11-10 PROCEDURE — 2709999900 HC NON-CHARGEABLE SUPPLY: Performed by: INTERNAL MEDICINE

## 2023-11-10 PROCEDURE — 0DJ08ZZ INSPECTION OF UPPER INTESTINAL TRACT, VIA NATURAL OR ARTIFICIAL OPENING ENDOSCOPIC: ICD-10-PCS | Performed by: INTERNAL MEDICINE

## 2023-11-10 PROCEDURE — C9113 INJ PANTOPRAZOLE SODIUM, VIA: HCPCS | Performed by: PHYSICIAN ASSISTANT

## 2023-11-10 PROCEDURE — 99255 IP/OBS CONSLTJ NEW/EST HI 80: CPT | Performed by: SURGERY

## 2023-11-10 PROCEDURE — 80048 BASIC METABOLIC PNL TOTAL CA: CPT

## 2023-11-10 RX ORDER — SODIUM CHLORIDE 9 MG/ML
INJECTION, SOLUTION INTRAVENOUS PRN
Status: DISCONTINUED | OUTPATIENT
Start: 2023-11-10 | End: 2023-11-11

## 2023-11-10 RX ORDER — PROPOFOL 10 MG/ML
INJECTION, EMULSION INTRAVENOUS PRN
Status: DISCONTINUED | OUTPATIENT
Start: 2023-11-10 | End: 2023-11-10 | Stop reason: SDUPTHER

## 2023-11-10 RX ORDER — LIDOCAINE HYDROCHLORIDE 20 MG/ML
INJECTION, SOLUTION EPIDURAL; INFILTRATION; INTRACAUDAL; PERINEURAL PRN
Status: DISCONTINUED | OUTPATIENT
Start: 2023-11-10 | End: 2023-11-10 | Stop reason: SDUPTHER

## 2023-11-10 RX ORDER — SODIUM CHLORIDE, SODIUM LACTATE, POTASSIUM CHLORIDE, CALCIUM CHLORIDE 600; 310; 30; 20 MG/100ML; MG/100ML; MG/100ML; MG/100ML
INJECTION, SOLUTION INTRAVENOUS CONTINUOUS
Status: DISCONTINUED | OUTPATIENT
Start: 2023-11-10 | End: 2023-11-12 | Stop reason: HOSPADM

## 2023-11-10 RX ADMIN — ACETAMINOPHEN 325MG 650 MG: 325 TABLET ORAL at 17:36

## 2023-11-10 RX ADMIN — SODIUM CHLORIDE 40 MG: 9 INJECTION INTRAMUSCULAR; INTRAVENOUS; SUBCUTANEOUS at 10:46

## 2023-11-10 RX ADMIN — SODIUM CHLORIDE, PRESERVATIVE FREE 10 ML: 5 INJECTION INTRAVENOUS at 22:25

## 2023-11-10 RX ADMIN — SODIUM CHLORIDE, POTASSIUM CHLORIDE, SODIUM LACTATE AND CALCIUM CHLORIDE: 600; 310; 30; 20 INJECTION, SOLUTION INTRAVENOUS at 13:27

## 2023-11-10 RX ADMIN — PROPOFOL 50 MG: 10 INJECTION, EMULSION INTRAVENOUS at 14:20

## 2023-11-10 RX ADMIN — SODIUM CHLORIDE, PRESERVATIVE FREE 10 ML: 5 INJECTION INTRAVENOUS at 10:47

## 2023-11-10 RX ADMIN — LIDOCAINE HYDROCHLORIDE 100 MG: 20 INJECTION, SOLUTION EPIDURAL; INFILTRATION; INTRACAUDAL; PERINEURAL at 14:18

## 2023-11-10 RX ADMIN — SODIUM CHLORIDE 40 MG: 9 INJECTION INTRAMUSCULAR; INTRAVENOUS; SUBCUTANEOUS at 22:16

## 2023-11-10 RX ADMIN — ACETAMINOPHEN 325MG 650 MG: 325 TABLET ORAL at 11:36

## 2023-11-10 RX ADMIN — PROPOFOL 100 MG: 10 INJECTION, EMULSION INTRAVENOUS at 14:18

## 2023-11-10 RX ADMIN — PROPOFOL 50 MG: 10 INJECTION, EMULSION INTRAVENOUS at 14:22

## 2023-11-10 RX ADMIN — IOPAMIDOL 100 ML: 755 INJECTION, SOLUTION INTRAVENOUS at 19:04

## 2023-11-10 ASSESSMENT — PAIN SCALES - GENERAL
PAINLEVEL_OUTOF10: 0
PAINLEVEL_OUTOF10: 3
PAINLEVEL_OUTOF10: 0
PAINLEVEL_OUTOF10: 3
PAINLEVEL_OUTOF10: 0
PAINLEVEL_OUTOF10: 5

## 2023-11-10 ASSESSMENT — PAIN DESCRIPTION - FREQUENCY
FREQUENCY: INTERMITTENT
FREQUENCY: CONTINUOUS
FREQUENCY: INTERMITTENT

## 2023-11-10 ASSESSMENT — PAIN DESCRIPTION - DESCRIPTORS
DESCRIPTORS: ACHING
DESCRIPTORS: ACHING

## 2023-11-10 ASSESSMENT — PAIN - FUNCTIONAL ASSESSMENT
PAIN_FUNCTIONAL_ASSESSMENT: PREVENTS OR INTERFERES SOME ACTIVE ACTIVITIES AND ADLS
PAIN_FUNCTIONAL_ASSESSMENT: 0-10

## 2023-11-10 ASSESSMENT — PAIN DESCRIPTION - PAIN TYPE
TYPE: ACUTE PAIN
TYPE: ACUTE PAIN

## 2023-11-10 ASSESSMENT — PAIN DESCRIPTION - LOCATION
LOCATION: ABDOMEN
LOCATION: HEAD
LOCATION: HEAD

## 2023-11-10 ASSESSMENT — PAIN DESCRIPTION - ORIENTATION: ORIENTATION: RIGHT;LOWER

## 2023-11-10 ASSESSMENT — PAIN DESCRIPTION - ONSET: ONSET: OTHER (COMMENT)

## 2023-11-10 NOTE — PERIOP NOTE
TRANSFER - OUT REPORT:    Verbal report given to SALAS Goodwin on Shelley Martin  being transferred to [facility] for routine post-op       Report consisted of patient's Situation, Background, Assessment and   Recommendations(SBAR). Information from the following report(s) Adult Overview, Surgery Report, MAR, and Cardiac Rhythm S. Tach  was reviewed with the receiving nurse. Lines:   Peripheral IV 11/08/23 Left Antecubital (Active)   Site Assessment Clean, dry & intact 11/10/23 1432   Line Status Infusing 11/10/23 1432   Line Care Connections checked and tightened 11/10/23 1432   Phlebitis Assessment No symptoms 11/10/23 1432   Infiltration Assessment 0 11/10/23 1432   Alcohol Cap Used No 11/10/23 1432   Dressing Status Clean, dry & intact 11/10/23 1432   Dressing Type Transparent 11/10/23 1432   Dressing Intervention New 11/08/23 2148       Peripheral IV 11/09/23 Left;Posterior Forearm (Active)   Site Assessment Clean, dry & intact 11/10/23 1050   Line Status Capped;Flushed;Normal saline locked 11/10/23 [address] Connections checked and tightened 11/10/23 1050   Phlebitis Assessment No symptoms 11/10/23 1050   Infiltration Assessment 0 11/10/23 1050   Alcohol Cap Used Yes 11/10/23 1050   Dressing Status Clean, dry & intact 11/10/23 1050   Dressing Type Transparent 11/10/23 1050        Opportunity for questions and clarification was provided.       Patient transported with:  [employer]

## 2023-11-10 NOTE — ACP (ADVANCE CARE PLANNING)
Advance Care Planning     Advance Care Planning Inpatient Note  Spiritual Care Department    Today's Date: 11/10/2023  Unit: [facility] [address]    Received request from yaniv. Upon review of chart and communication with care team, patient's decision making abilities are not in question. . Patient was/were present in the room during visit. Goals of ACP Conversation:  Discuss advance care planning documents    Health Care Decision Makers:    Chart reads:   Primary Decision Maker: Sonia Joiner (sister) - Other - [phone]  Summary:  Patient declined ACP conversation but mentioned that if something were to happen to him, his sister Sonia Joiner will know what to do. No healthcare decision maker documented. Sonia Joiner @ [phone] is the [facility]) per patient's report.     Advance Care Planning Documents (Patient Wishes):  None     Assessment:      Interventions:  Patient DECLINED ACP conversation    Care Preferences Communicated:   No    Outcomes/Plan:  ACP Discussion: Refused    Electronically signed by Renee Chavez, [address] [place] [address] on 11/10/2023 at 5:20 PM

## 2023-11-10 NOTE — CONSULTS
[email or web address]"  [phone]    GASTROENTEROLOGY CONSULT      Impression:   1. GI bleed - painless hematochezia w/ large volume dark red rectal bleeding began abruptly yesterday. Most likely lower GI bleed, however brisk UGI bleed also a consideration - less likely given hemodynamic stability  2. Acute blood loss anemia - H/H on presentation 10.6/32.3, decreased to 8.3/24.5 this morning.   - He has remained hemodynamically stable, however initial /106, pulse 104, now /65, Pulse 112. - risk for decompensation. 3. NSAID use - takes 1BC nightly and 800mg ibuprofen once a day in AM  4. HTN      Plan:     1. STAT CT GI bleed protocol ordered  2. Monitor H/H, transfuse for Hgb <7  3. IV PPI BID  4. NPO  5. If CT negative for active GI bleed or localizes to upper GI source will plan for EGD  6. Continue medical management per primary team. Plan discussed w/ RN Lucian Polo. Chief Complaint: GI bleed      HPI:  Radha Nolen is a 51 y.o. male who I am being asked to see in consultation for an opinion regarding GI bleed. He developed acute onset large volume dark red blood per rectum while at work yesterday afternoon. He had 4+ episodes in the first hour, 2 additional episodes upon arriving home at which point he called 911. He has had several additional episodes of moderate to large volume hematochezia since arriving, most recently just a few minutes ago. Denies abdominal pain but has mild bloating discomfort. No prior episodes of GI bleeding. No prior EGD or colonoscopy. Admits to taking BC powder every night and 800mg ibuprofen every morning. He denies abdominal pain, nausea, vomiting, weight loss, change in appetite, bowel changes. No pertinent family history. He has remained hemodynamically stable, however initial /106, pulse 104, now /65, Pulse 112.       PMH:   Past Medical History:   Diagnosis Date    Asthma     Genital warts     History of RPR test 2/26/2015    positive
Eyes:  conjunctivae and sclerae normal, pupils equal, round, reactive to light   Throat & Neck: normal, no erythema or exudates noted.  , and no palpable masses   Lungs:   clear to auscultation bilaterally   Heart:  Regular rate and rhythm   Abdomen:   rounded, soft, nontender, nondistended, no masses or organomegaly   Extremities: extremities normal, atraumatic, no cyanosis or edema   Skin: Normal.       Imaging and Lab Review:     CBC:   Lab Results   Component Value Date/Time    WBC 7.4 11/09/2023 04:27 AM    RBC 2.65 11/09/2023 04:27 AM    HGB 6.7 11/09/2023 08:07 PM    HCT 20.1 11/09/2023 08:07 PM     11/09/2023 04:27 AM     BMP:   Lab Results   Component Value Date/Time     11/09/2023 04:27 AM    K 4.0 11/09/2023 04:27 AM     11/09/2023 04:27 AM    CO2 26 11/09/2023 04:27 AM    BUN 33 11/09/2023 04:27 AM     CMP:  Lab Results   Component Value Date/Time     11/09/2023 04:27 AM    K 4.0 11/09/2023 04:27 AM     11/09/2023 04:27 AM    CO2 26 11/09/2023 04:27 AM    BUN 33 11/09/2023 04:27 AM    GLOB 2.8 11/08/2023 09:37 PM       Recent Results (from the past 24 hour(s))   POCT Glucose    Collection Time: 11/09/23  7:47 AM   Result Value Ref Range    POC Glucose 136 (H) 70 - 110 mg/dL   POCT Glucose    Collection Time: 11/09/23 11:43 AM   Result Value Ref Range    POC Glucose 128 (H) 70 - 110 mg/dL   Hemoglobin and Hematocrit    Collection Time: 11/09/23 12:13 PM   Result Value Ref Range    Hemoglobin 6.5 (L) 13.0 - 16.0 g/dL    Hematocrit 19.4 (L) 36.0 - 48.0 %   PREPARE RBC (CROSSMATCH), 1 Units    Collection Time: 11/09/23  1:00 PM   Result Value Ref Range    History Check Historical check performed    POCT Glucose    Collection Time: 11/09/23  6:00 PM   Result Value Ref Range    POC Glucose 121 (H) 70 - 110 mg/dL   Hemoglobin and Hematocrit    Collection Time: 11/09/23  8:07 PM   Result Value Ref Range    Hemoglobin 6.7 (L) 13.0 - 16.0 g/dL    Hematocrit 20.1 (L) 36.0 - 48.0 %

## 2023-11-10 NOTE — ANESTHESIA POSTPROCEDURE EVALUATION
Department of Anesthesiology  Postprocedure Note    Patient: Macarena Balbuena  MRN: [id number]  YOB: 1975  Date of evaluation: 11/10/2023      Procedure Summary     Date: 11/10/23 Room / Location: [facility] ENDO 02 / [facility]    Anesthesia Start: 1415 Anesthesia Stop: 5178    Procedure: EGD ESOPHAGOGASTRODUODENOSCOPY (Upper GI Region) Diagnosis:       Gastrointestinal hemorrhage, unspecified gastrointestinal hemorrhage type      (Gastrointestinal hemorrhage, unspecified gastrointestinal hemorrhage type [K92.2])    Surgeons: Burns Cushing, MD Responsible Provider: Efe Kong MD    Anesthesia Type: MAC ASA Status: 3          Anesthesia Type: MAC    Aurora Phase I: Aurora Score: 10    Aurora Phase II: Aurora Score: 10      Anesthesia Post Evaluation    Patient location during evaluation: PACU  Patient participation: complete - patient participated  Level of consciousness: sleepy but conscious  Pain score: 0  Airway patency: patent  Nausea & Vomiting: no nausea and no vomiting  Complications: no  Cardiovascular status: blood pressure returned to baseline  Respiratory status: acceptable  Hydration status: euvolemic  Pain management: adequate

## 2023-11-10 NOTE — PROGRESS NOTES
Nursing called to report Hgb low, still with bloody BM's. Pt finished 1 unit PRBC. Stable SBP at this time. Will order 2 units PRBC transfusion back to back for now.    CTA noted on admission, no bleeding source       Colette Gomez MD

## 2023-11-10 NOTE — PLAN OF CARE
Problem: Pain  Goal: Verbalizes/displays adequate comfort level or baseline comfort level  Outcome: Progressing     Problem: Safety - Adult  Goal: Free from fall injury  Outcome: Progressing     Problem: Discharge Planning  Goal: Discharge to home or other facility with appropriate resources  Outcome: Progressing  Flowsheets  Taken 11/9/2023 2000 by Anton Preciado RN  Discharge to home or other facility with appropriate resources:   Identify barriers to discharge with patient and caregiver   Arrange for needed discharge resources and transportation as appropriate   Identify discharge learning needs (meds, wound care, etc)  Taken 11/9/2023 1757 by Adele Rowland RN  Discharge to home or other facility with appropriate resources:   Identify barriers to discharge with patient and caregiver   Arrange for needed discharge resources and transportation as appropriate   Identify discharge learning needs (meds, wound care, etc)

## 2023-11-10 NOTE — PROGRESS NOTES
Bedside shift change report given to Nohemi Cook RN (oncoming nurse) by Gill Krishnamurthy RN  (offgoing nurse). Report included the following information Nurse Handoff Report, Recent Results, Cardiac Rhythm Sinus tach, and Event Log.      2205- Per patient, he had a large bowel movement with numerous clots present. Verbalized not flushing next time so clots can be visualized by nurses. 5132- Second episode of blood per rectum. Dark red upon visualization, clots present.

## 2023-11-10 NOTE — PERIOP NOTE
TRANSFER - IN REPORT:    Verbal report received from [address]  being received from  # [phone] for ordered procedure      Report consisted of patient's Situation, Background, Assessment and   Recommendations(SBAR). Information from the following report(s) Nurse Handoff Report was reviewed with the receiving nurse. Opportunity for questions and clarification was provided. Assessment completed upon patient's arrival to unit and care assumed.

## 2023-11-10 NOTE — PROCEDURES
[email or web address]  [phone]     Endoscopic Gastroduodenoscopy Procedure Note    Girma Shoemaker  1975  [id number]    Indication:   1. Overt-obscure GIB  2. NSAID use  3. Acute anemia    : Ligia Gómez MD    Surgical Assistants: none    Referring Provider: Aspen Mascorro MD    Anesthesia/Sedation:  MAC anesthesia Propofol      Procedure Details     After infomed consent was obtained for the procedure, with all risks and benefits of procedure explained the patient was taken to the endoscopy suite and placed in the left lateral decubitus position. Following sequential administration of sedation as per above, the endoscope was inserted into the mouth and advanced under direct vision to second portion of the duodenum. A careful inspection was made as the gastroscope was withdrawn, including a retroflexed view of the proximal stomach; findings and interventions are described below. Findings:   Esophagus:normal  Stomach: normal   Duodenum/jejunum:  mild patchy erythema localized to the duodenal bulb and sweep, no signs of active or recent hemorrhage    Therapies:  none    Specimens: * No specimens in log *           Complications:   None; patient tolerated the procedure well. EBL:  None. Tissue Implant Device: None           Impression:    1. Duodenitis- no obvious source for GIB, no signs of active or recent UGI hemorrhage    Recommendations:  -Continue acid suppression daily  -Check stool for H pylori and treat if positive  -Monitor H/H and transfuse as indicated. -Monitor for recurrent overt hemorrhage, consider colonoscopy as inpatient should patient display overt hemorrhage with associated drop in hgb  -My have full liquid diet now    Disposition: Return to medical floor for ongoing care.     Ligia Gómez MD  11/10/2023  2:25 PM    Georgina Sky MD  [facility] [address] [phone]  Cell -

## 2023-11-11 LAB
ANION GAP SERPL CALC-SCNC: 3 MMOL/L (ref 3–18)
BASOPHILS # BLD: 0 K/UL (ref 0–0.1)
BASOPHILS NFR BLD: 0 % (ref 0–2)
BUN SERPL-MCNC: 16 MG/DL (ref 7–18)
BUN/CREAT SERPL: 18 (ref 12–20)
CALCIUM SERPL-MCNC: 7.5 MG/DL (ref 8.5–10.1)
CHLORIDE SERPL-SCNC: 112 MMOL/L (ref 100–111)
CO2 SERPL-SCNC: 28 MMOL/L (ref 21–32)
CREAT SERPL-MCNC: 0.89 MG/DL (ref 0.6–1.3)
DIFFERENTIAL METHOD BLD: ABNORMAL
EOSINOPHIL # BLD: 0.1 K/UL (ref 0–0.4)
EOSINOPHIL NFR BLD: 1 % (ref 0–5)
ERYTHROCYTE [DISTWIDTH] IN BLOOD BY AUTOMATED COUNT: 16.1 % (ref 11.6–14.5)
GLUCOSE BLD STRIP.AUTO-MCNC: 103 MG/DL (ref 70–110)
GLUCOSE BLD STRIP.AUTO-MCNC: 115 MG/DL (ref 70–110)
GLUCOSE BLD STRIP.AUTO-MCNC: 127 MG/DL (ref 70–110)
GLUCOSE BLD STRIP.AUTO-MCNC: 131 MG/DL (ref 70–110)
GLUCOSE SERPL-MCNC: 114 MG/DL (ref 74–99)
HCT VFR BLD AUTO: 21.6 % (ref 36–48)
HCT VFR BLD AUTO: 23.1 % (ref 36–48)
HGB BLD-MCNC: 7.4 G/DL (ref 13–16)
HGB BLD-MCNC: 7.7 G/DL (ref 13–16)
IMM GRANULOCYTES # BLD AUTO: 0.1 K/UL (ref 0–0.04)
IMM GRANULOCYTES NFR BLD AUTO: 1 % (ref 0–0.5)
LYMPHOCYTES # BLD: 1.7 K/UL (ref 0.9–3.6)
LYMPHOCYTES NFR BLD: 25 % (ref 21–52)
MCH RBC QN AUTO: 30 PG (ref 24–34)
MCHC RBC AUTO-ENTMCNC: 34.3 G/DL (ref 31–37)
MCV RBC AUTO: 87.4 FL (ref 78–100)
MONOCYTES # BLD: 0.7 K/UL (ref 0.05–1.2)
MONOCYTES NFR BLD: 10 % (ref 3–10)
NEUTS SEG # BLD: 4.3 K/UL (ref 1.8–8)
NEUTS SEG NFR BLD: 63 % (ref 40–73)
NRBC # BLD: 0.03 K/UL (ref 0–0.01)
NRBC BLD-RTO: 0.4 PER 100 WBC
PLATELET # BLD AUTO: 117 K/UL (ref 135–420)
PMV BLD AUTO: 9.9 FL (ref 9.2–11.8)
POTASSIUM SERPL-SCNC: 3.6 MMOL/L (ref 3.5–5.5)
RBC # BLD AUTO: 2.47 M/UL (ref 4.35–5.65)
SODIUM SERPL-SCNC: 143 MMOL/L (ref 136–145)
WBC # BLD AUTO: 6.9 K/UL (ref 4.6–13.2)

## 2023-11-11 PROCEDURE — 2140000001 HC CVICU INTERMEDIATE R&B

## 2023-11-11 PROCEDURE — 85014 HEMATOCRIT: CPT

## 2023-11-11 PROCEDURE — 6370000000 HC RX 637 (ALT 250 FOR IP): Performed by: INTERNAL MEDICINE

## 2023-11-11 PROCEDURE — 6360000002 HC RX W HCPCS: Performed by: INTERNAL MEDICINE

## 2023-11-11 PROCEDURE — 80048 BASIC METABOLIC PNL TOTAL CA: CPT

## 2023-11-11 PROCEDURE — A4216 STERILE WATER/SALINE, 10 ML: HCPCS | Performed by: PHYSICIAN ASSISTANT

## 2023-11-11 PROCEDURE — 82962 GLUCOSE BLOOD TEST: CPT

## 2023-11-11 PROCEDURE — C9113 INJ PANTOPRAZOLE SODIUM, VIA: HCPCS | Performed by: PHYSICIAN ASSISTANT

## 2023-11-11 PROCEDURE — 36430 TRANSFUSION BLD/BLD COMPNT: CPT

## 2023-11-11 PROCEDURE — 85025 COMPLETE CBC W/AUTO DIFF WBC: CPT

## 2023-11-11 PROCEDURE — 94761 N-INVAS EAR/PLS OXIMETRY MLT: CPT

## 2023-11-11 PROCEDURE — 2580000003 HC RX 258: Performed by: PHYSICIAN ASSISTANT

## 2023-11-11 PROCEDURE — P9016 RBC LEUKOCYTES REDUCED: HCPCS

## 2023-11-11 PROCEDURE — 85018 HEMOGLOBIN: CPT

## 2023-11-11 PROCEDURE — 87338 HPYLORI STOOL AG IA: CPT

## 2023-11-11 PROCEDURE — 99232 SBSQ HOSP IP/OBS MODERATE 35: CPT | Performed by: INTERNAL MEDICINE

## 2023-11-11 PROCEDURE — 36415 COLL VENOUS BLD VENIPUNCTURE: CPT

## 2023-11-11 PROCEDURE — 6360000002 HC RX W HCPCS: Performed by: PHYSICIAN ASSISTANT

## 2023-11-11 RX ORDER — POLYETHYLENE GLYCOL 3350 17 G/17G
255 POWDER, FOR SOLUTION ORAL ONCE
Status: DISCONTINUED | OUTPATIENT
Start: 2023-11-11 | End: 2023-11-11

## 2023-11-11 RX ORDER — PHYTONADIONE 5 MG/1
10 TABLET ORAL ONCE
Status: DISCONTINUED | OUTPATIENT
Start: 2023-11-11 | End: 2023-11-11

## 2023-11-11 RX ADMIN — SODIUM CHLORIDE, PRESERVATIVE FREE 10 ML: 5 INJECTION INTRAVENOUS at 08:57

## 2023-11-11 RX ADMIN — SODIUM CHLORIDE 40 MG: 9 INJECTION INTRAMUSCULAR; INTRAVENOUS; SUBCUTANEOUS at 08:57

## 2023-11-11 RX ADMIN — HYDRALAZINE HYDROCHLORIDE 10 MG: 20 INJECTION, SOLUTION INTRAMUSCULAR; INTRAVENOUS at 23:38

## 2023-11-11 RX ADMIN — POLYETHYLENE GLYCOL-3350 AND ELECTROLYTES 4000 ML: 236; 6.74; 5.86; 2.97; 22.74 POWDER, FOR SOLUTION ORAL at 19:44

## 2023-11-11 RX ADMIN — SODIUM CHLORIDE 40 MG: 9 INJECTION INTRAMUSCULAR; INTRAVENOUS; SUBCUTANEOUS at 20:52

## 2023-11-11 RX ADMIN — PHYTONADIONE 10 MG: 10 INJECTION, EMULSION INTRAMUSCULAR; INTRAVENOUS; SUBCUTANEOUS at 15:47

## 2023-11-11 RX ADMIN — SODIUM CHLORIDE, PRESERVATIVE FREE 10 ML: 5 INJECTION INTRAVENOUS at 20:56

## 2023-11-11 ASSESSMENT — PAIN SCALES - GENERAL
PAINLEVEL_OUTOF10: 0

## 2023-11-11 NOTE — PROGRESS NOTES
Bedside shift change report given to Dimas Gann RN (oncoming nurse) by Richard Buckner RN (offgoing nurse). Report included the following information Nurse Handoff Report, Recent Results, Med Rec Status, and Cardiac Rhythm NSR/ Sinus tach . Patient had two episodes of bloody bowel movements overnight. Bedside shift change report given to Hortensia Alcantar RN (oncoming nurse) by Dimas Gann RN (offgoing nurse). Report included the following information Nurse Handoff Report, Adult Overview, Surgery Report, Med Rec Status, and Cardiac Rhythm NSR .

## 2023-11-11 NOTE — PLAN OF CARE
Problem: Pain  Goal: Verbalizes/displays adequate comfort level or baseline comfort level  Outcome: Progressing  Flowsheets (Taken 11/11/2023 0730)  Verbalizes/displays adequate comfort level or baseline comfort level:   Encourage patient to monitor pain and request assistance   Assess pain using appropriate pain scale   Administer analgesics based on type and severity of pain and evaluate response   Implement non-pharmacological measures as appropriate and evaluate response   Consider cultural and social influences on pain and pain management     Problem: Safety - Adult  Goal: Free from fall injury  Outcome: Progressing     Problem: Discharge Planning  Goal: Discharge to home or other facility with appropriate resources  Outcome: Progressing  Flowsheets (Taken 11/11/2023 0800)  Discharge to home or other facility with appropriate resources:   Identify barriers to discharge with patient and caregiver   Arrange for needed discharge resources and transportation as appropriate   Identify discharge learning needs (meds, wound care, etc)   Refer to discharge planning if patient needs post-hospital services based on physician order or complex needs related to functional status, cognitive ability or social support system

## 2023-11-11 NOTE — PROGRESS NOTES
0700: Bedside and Verbal shift change report given to Rl Greco RN (oncoming nurse) by Jeremiah Chicas RN (offgoing nurse). Report included the following information Nurse Handoff Report, Adult Overview, Intake/Output, and Cardiac Rhythm NSR .      1200: Pt had bowel movement with blood stools. MD notified of status. Received telephone orders to start pt  as NPO, pt to be placed on bowel prep regimen. Sample for H. Pylori in stool collected. Pt transport to obtain sample. 1900: Bedside and Verbal shift change report given to SALAS Lopez (oncoming nurse) by Rl Greco RN (offgoing nurse). Report included the following information Nurse Handoff Report, Adult Overview, Intake/Output, Recent Results, Cardiac Rhythm NSR, and Event Log.

## 2023-11-11 NOTE — PROGRESS NOTES
1200: Pt had dark bloody/clotted stool, sample collected. Dr. Tito De La Cruz made aware. MD to notify GI doctor to go through with colonoscopy. Received telephone orders to start pt  as NPO, pt to be placed on bowel prep regimen. Sample for H. Pylori in stool collected. Pt transport to obtain sample.

## 2023-11-12 ENCOUNTER — ANESTHESIA EVENT (OUTPATIENT)
Facility: HOSPITAL | Age: 48
End: 2023-11-12
Payer: COMMERCIAL

## 2023-11-12 ENCOUNTER — ANESTHESIA (OUTPATIENT)
Facility: HOSPITAL | Age: 48
End: 2023-11-12
Payer: COMMERCIAL

## 2023-11-12 VITALS
SYSTOLIC BLOOD PRESSURE: 139 MMHG | WEIGHT: 220 LBS | RESPIRATION RATE: 17 BRPM | BODY MASS INDEX: 31.5 KG/M2 | OXYGEN SATURATION: 100 % | DIASTOLIC BLOOD PRESSURE: 79 MMHG | TEMPERATURE: 98.1 F | HEIGHT: 70 IN | HEART RATE: 94 BPM

## 2023-11-12 LAB
ABO + RH BLD: NORMAL
BLD PROD TYP BPU: NORMAL
BLOOD BANK DISPENSE STATUS: NORMAL
BLOOD GROUP ANTIBODIES SERPL: NORMAL
BPU ID: NORMAL
CALLED TO: NORMAL
CALLED TO:,BCALL3: NORMAL
CALLED TO:: NORMAL
CROSSMATCH RESULT: NORMAL
GLUCOSE BLD STRIP.AUTO-MCNC: 100 MG/DL (ref 70–110)
GLUCOSE BLD STRIP.AUTO-MCNC: 121 MG/DL (ref 70–110)
HCT VFR BLD AUTO: 23.3 % (ref 36–48)
HGB BLD-MCNC: 7.8 G/DL (ref 13–16)
SPECIMEN EXP DATE BLD: NORMAL
UNIT DIVISION: 0

## 2023-11-12 PROCEDURE — 2500000003 HC RX 250 WO HCPCS: Performed by: NURSE ANESTHETIST, CERTIFIED REGISTERED

## 2023-11-12 PROCEDURE — 82962 GLUCOSE BLOOD TEST: CPT

## 2023-11-12 PROCEDURE — 3700000001 HC ADD 15 MINUTES (ANESTHESIA): Performed by: INTERNAL MEDICINE

## 2023-11-12 PROCEDURE — 6370000000 HC RX 637 (ALT 250 FOR IP): Performed by: INTERNAL MEDICINE

## 2023-11-12 PROCEDURE — 3600007512: Performed by: INTERNAL MEDICINE

## 2023-11-12 PROCEDURE — 36415 COLL VENOUS BLD VENIPUNCTURE: CPT

## 2023-11-12 PROCEDURE — 2580000003 HC RX 258: Performed by: PHYSICIAN ASSISTANT

## 2023-11-12 PROCEDURE — 0DJD8ZZ INSPECTION OF LOWER INTESTINAL TRACT, VIA NATURAL OR ARTIFICIAL OPENING ENDOSCOPIC: ICD-10-PCS | Performed by: INTERNAL MEDICINE

## 2023-11-12 PROCEDURE — 2709999900 HC NON-CHARGEABLE SUPPLY: Performed by: INTERNAL MEDICINE

## 2023-11-12 PROCEDURE — 7100000000 HC PACU RECOVERY - FIRST 15 MIN: Performed by: INTERNAL MEDICINE

## 2023-11-12 PROCEDURE — 85014 HEMATOCRIT: CPT

## 2023-11-12 PROCEDURE — 3600007502: Performed by: INTERNAL MEDICINE

## 2023-11-12 PROCEDURE — 6370000000 HC RX 637 (ALT 250 FOR IP): Performed by: PHYSICIAN ASSISTANT

## 2023-11-12 PROCEDURE — 7100000010 HC PHASE II RECOVERY - FIRST 15 MIN: Performed by: INTERNAL MEDICINE

## 2023-11-12 PROCEDURE — 85018 HEMOGLOBIN: CPT

## 2023-11-12 PROCEDURE — 6360000002 HC RX W HCPCS: Performed by: NURSE ANESTHETIST, CERTIFIED REGISTERED

## 2023-11-12 PROCEDURE — 3700000000 HC ANESTHESIA ATTENDED CARE: Performed by: INTERNAL MEDICINE

## 2023-11-12 RX ORDER — AMLODIPINE BESYLATE 10 MG/1
10 TABLET ORAL DAILY
Status: DISCONTINUED | OUTPATIENT
Start: 2023-11-12 | End: 2023-11-12 | Stop reason: HOSPADM

## 2023-11-12 RX ORDER — PANTOPRAZOLE SODIUM 40 MG/1
40 TABLET, DELAYED RELEASE ORAL
Status: DISCONTINUED | OUTPATIENT
Start: 2023-11-12 | End: 2023-11-12 | Stop reason: HOSPADM

## 2023-11-12 RX ORDER — PROPOFOL 10 MG/ML
INJECTION, EMULSION INTRAVENOUS CONTINUOUS PRN
Status: DISCONTINUED | OUTPATIENT
Start: 2023-11-12 | End: 2023-11-12 | Stop reason: SDUPTHER

## 2023-11-12 RX ORDER — LIDOCAINE HYDROCHLORIDE 20 MG/ML
INJECTION, SOLUTION EPIDURAL; INFILTRATION; INTRACAUDAL; PERINEURAL PRN
Status: DISCONTINUED | OUTPATIENT
Start: 2023-11-12 | End: 2023-11-12 | Stop reason: SDUPTHER

## 2023-11-12 RX ORDER — ONDANSETRON 2 MG/ML
4 INJECTION INTRAMUSCULAR; INTRAVENOUS
Status: CANCELLED | OUTPATIENT
Start: 2023-11-12 | End: 2023-11-13

## 2023-11-12 RX ORDER — AMLODIPINE BESYLATE 10 MG/1
10 TABLET ORAL DAILY
Qty: 30 TABLET | Refills: 3 | Status: SHIPPED | OUTPATIENT
Start: 2023-11-12

## 2023-11-12 RX ORDER — FENTANYL CITRATE 50 UG/ML
25 INJECTION, SOLUTION INTRAMUSCULAR; INTRAVENOUS EVERY 5 MIN PRN
Status: CANCELLED | OUTPATIENT
Start: 2023-11-12

## 2023-11-12 RX ORDER — SODIUM CHLORIDE 9 MG/ML
INJECTION, SOLUTION INTRAVENOUS PRN
Status: CANCELLED | OUTPATIENT
Start: 2023-11-12

## 2023-11-12 RX ORDER — SODIUM CHLORIDE 0.9 % (FLUSH) 0.9 %
5-40 SYRINGE (ML) INJECTION EVERY 12 HOURS SCHEDULED
Status: CANCELLED | OUTPATIENT
Start: 2023-11-12

## 2023-11-12 RX ORDER — SIMETHICONE 20 MG/.3ML
EMULSION ORAL PRN
Status: DISCONTINUED | OUTPATIENT
Start: 2023-11-12 | End: 2023-11-12 | Stop reason: ALTCHOICE

## 2023-11-12 RX ORDER — DIPHENHYDRAMINE HYDROCHLORIDE 50 MG/ML
12.5 INJECTION INTRAMUSCULAR; INTRAVENOUS
Status: CANCELLED | OUTPATIENT
Start: 2023-11-12 | End: 2023-11-13

## 2023-11-12 RX ORDER — SODIUM CHLORIDE 0.9 % (FLUSH) 0.9 %
5-40 SYRINGE (ML) INJECTION PRN
Status: CANCELLED | OUTPATIENT
Start: 2023-11-12

## 2023-11-12 RX ADMIN — SODIUM CHLORIDE, PRESERVATIVE FREE 10 ML: 5 INJECTION INTRAVENOUS at 10:01

## 2023-11-12 RX ADMIN — PROPOFOL 180 MCG/KG/MIN: 10 INJECTION, EMULSION INTRAVENOUS at 07:31

## 2023-11-12 RX ADMIN — AMLODIPINE BESYLATE 10 MG: 10 TABLET ORAL at 11:27

## 2023-11-12 RX ADMIN — ACETAMINOPHEN 325MG 650 MG: 325 TABLET ORAL at 06:59

## 2023-11-12 RX ADMIN — PANTOPRAZOLE SODIUM 40 MG: 40 TABLET, DELAYED RELEASE ORAL at 10:00

## 2023-11-12 RX ADMIN — PROPOFOL 50 MG: 10 INJECTION, EMULSION INTRAVENOUS at 07:30

## 2023-11-12 RX ADMIN — LIDOCAINE HYDROCHLORIDE 20 MG: 20 INJECTION, SOLUTION EPIDURAL; INFILTRATION; INTRACAUDAL; PERINEURAL at 07:31

## 2023-11-12 ASSESSMENT — PAIN - FUNCTIONAL ASSESSMENT: PAIN_FUNCTIONAL_ASSESSMENT: NONE - DENIES PAIN

## 2023-11-12 ASSESSMENT — PAIN SCALES - GENERAL
PAINLEVEL_OUTOF10: 0
PAINLEVEL_OUTOF10: 6
PAINLEVEL_OUTOF10: 0

## 2023-11-12 ASSESSMENT — PAIN DESCRIPTION - DESCRIPTORS: DESCRIPTORS: ACHING

## 2023-11-12 ASSESSMENT — PAIN DESCRIPTION - ORIENTATION: ORIENTATION: ANTERIOR

## 2023-11-12 ASSESSMENT — PAIN DESCRIPTION - LOCATION: LOCATION: HEAD

## 2023-11-12 NOTE — PROGRESS NOTES
0805: Received report from PACU RN on Pt, colonoscopy procedure, no abnormal findings, pt to resume regular diet. 0820: Pt back on floor. Tele monitor back on pt.

## 2023-11-12 NOTE — PROGRESS NOTES
1950: Bedside and Verbal shift change report given to SALAS Lopez (oncoming nurse) by Dayanna Almanzar RN and Marcia Garvin RN (offgoing nurse). Report included the following information Nurse Handoff Report, Index, Intake/Output, MAR, Cardiac Rhythm NSR, and Quality Measures. .    Pt is alert and awake talking on the phone, pt's vital sign is stable at this time, pt denies of any pain and discomfort,    Pt was encouraged to continue to take his procedure tomorrow colonoscopy. 2030: Pt is doing well with the polyethylene glyco, pt  is consistent with the drink. 2130: Rounded on pt, pt has been having bowel movements    2228: Pt has had  X 5 bowel, and bowel is now clear at this time. 2230: Pt's /80    2338: Hydralazine 10 mg intravenous PRN for BP >180 given, will recheck pt's BP within an hour.

## 2023-11-12 NOTE — CARE COORDINATION
Patient is not medically ready, Active GI Bleed.
Patient's chart and discharge orders were reviewed by this SW. No CM needs identified at this time. Patient to discharge home.     Irvin Roque LMSW   Case Management
Patient expects to discharge to: (P) [address] [facility] for transportation at discharge: (P) Self    Financial    Payor: BCBS / Plan: [place] / Product Type: *No Product type* /     Does insurance require precert for SNF: yes     Potential assistance Purchasing Medications: (P) No  Meds-to-Beds request:        [facility] [address] [place]  [address] [place] [address]  Phone: [phone] Fax: [phone]      Notes:    Factors facilitating achievement of predicted outcomes: Family support, Friend support, Cooperative, Pleasant, Sense of humor, and Good insight into deficits    Barriers to discharge: Medical complications and Medication managment    Additional Case Management Notes: The Plan for Transition of Care is related to the following treatment goals of Lower GI bleed [H04.3]    IF APPLICABLE: The Patient and/or patient representative Mey Ceron and his family were provided with a choice of provider and agrees with the discharge plan. Freedom of choice list with basic dialogue that supports the patient's individualized plan of care/goals and shares the quality data associated with the providers was provided to:     Patient Representative Name:       The Patient and/or Patient Representative Agree with the Discharge Plan?       Emani Welsh RN  Case Management Department  Ph: [phone]       11/10/23 0850   Service Assessment   Patient Orientation Alert and Oriented   Cognition Alert   History Provided By Patient   Primary Caregiver Self   Support Systems Family Members;Friends/Neighbors   PCP Verified by CM Yes   Last Visit to PCP Within last year   Prior Functional Level Independent in ADLs/IADLs   Current Functional Level Independent in ADLs/IADLs   Can patient return to prior living arrangement Yes   Ability to make needs known: Good   Family able to assist with home care needs: Yes   Financial Resources Other

## 2023-11-12 NOTE — PROGRESS NOTES
0700: Bedside and Verbal shift change report given to Mellissa Campbell RN (oncoming nurse) by Lin Villegas RN (offgoing nurse). Report included the following information Nurse Handoff Report, Adult Overview, Recent Results, Cardiac Rhythm NSR, and Event Log.      0820: Pt returned back to floor from colonoscopy procedure. 1145: Called Dr. Angelica London regarding pt's H&H results. Hgb level increased to 7.8. Dr. Angelica London placing discharge orders for pt.     335 [id number]: Discharge instructions reviewed with patient. Pt verbalized understanding. Waiting on pt's items from security. 1300: Transportation has transported pt out to pt's ride.

## 2023-11-12 NOTE — DISCHARGE INSTRUCTIONS
Please make sure you measure your blood pressure at least once a day. If the top number is above 180 you need to call your doctor and if you have chest pain, headaches, nausea vomiting and high blood pressure you need to come back to the ED. If the top number of your blood pressure is less than 100 you need to stop taking your blood pressure medications and call your doctor. You need to come back to the ED if your blood pressure is low and you also experience lightheadedness, if you pass out or have shortness of breath and chest pain you need to come back to the ED.

## 2023-11-12 NOTE — PROGRESS NOTES
[address] [place] [address], [address] [place] [address]      This is to certify that  Mr Sabina Solis  was admitted to [address] [place] [address] on 11/8/2023 and discharged on 11/12/2023, and has been advised to convalesce at home until 11/14/2023.     Sincerely,    Laurie Peters MD  Hospitalist  [phone]

## 2023-11-12 NOTE — PLAN OF CARE
Problem: Pain  Goal: Verbalizes/displays adequate comfort level or baseline comfort level  Outcome: Progressing     Problem: Safety - Adult  Goal: Free from fall injury  Outcome: Progressing     Problem: Discharge Planning  Goal: Discharge to home or other facility with appropriate resources  Outcome: Progressing  Flowsheets (Taken 11/12/2023 5530)  Discharge to home or other facility with appropriate resources:   Identify barriers to discharge with patient and caregiver   Arrange for needed discharge resources and transportation as appropriate   Identify discharge learning needs (meds, wound care, etc)   Refer to discharge planning if patient needs post-hospital services based on physician order or complex needs related to functional status, cognitive ability or social support system

## 2023-11-12 NOTE — PROCEDURES
[address] [facility], [address] [place] [address]    Colonoscopy Procedure Note                 Indications:    Lower GI bleeding     :  Justin Armendariz MD    Referring Provider: Rosalinda Swain MD    Sedation:  MAC anesthesia Propofol    Procedure Details:  After informed consent was obtained with all risks and benefits of procedure explained and preoperative exam completed, the patient was taken to the endoscopy suite and placed in the left lateral decubitus position. Upon sequential sedation as per above, a digital rectal exam was performed and was normal.  The Olympus videocolonoscope was inserted in the rectum and carefully advanced to the cecum, which was identified by the ileocecal valve and appendiceal orifice. The quality of preparation was excellent. The colonoscope was slowly withdrawn with careful evaluation between folds. Retroflexion in the rectum was performed and was normal.    Findings:   Rectum: Internal hemorrhoids. Sigmoid: diverticulosis  Descending Colon:     -Diverticulosis  Transverse Colon:     -Diverticulosis  Ascending Colon:     -Diverticulosis  Cecum: normal  Terminal Ileum: not intubated    Interventions:  none    Specimen Removed:  none    Complications: None. EBL:  None. Impression: Diverticulosis  and hemorrhoids. No active bleeding. Most likely bleeding from diverticulosis which has stopped. Recommendations: -Regular diet. Resume normal medication(s). No further GI recommendation and will sign off. Call us as needed. No implants.     Justin Armendariz MD  11/12/2023  7:44 AM

## 2023-11-15 LAB
H PYLORI AG STL QL IA: NEGATIVE
SPECIMEN SOURCE: NORMAL

## 2023-11-17 NOTE — ADT AUTH CERT
Assessment:   Richard Humphrey is a 51 y.o. male who is presenting with a GI bleed of unknown origin. Currently the patient is hemodynamically stable. There is no role at all for surgery currently. The patient will need multiple studies done and further investigation to identify bleeding source and attempt to control it with either the assistant of the GI or the interventional radiology team.  The patient will need upper and lower endoscopies and even repeat of his CT angio before deciding on any surgical intervention. I discussed the case with Dr. Oralia Sykes. Plan:      No need for any general surgery intervention  Follow-up with the GI team for upper and lower endoscopies  Follow-up with IR for further evaluation and to try to identify the bleeder  We will be available in case needed but for now I will sign off                     Endoscopic Gastroduodenoscopy Procedure Note     Impression:    1. Duodenitis- no obvious source for GIB, no signs of active or recent UGI hemorrhage     Recommendations:  -Continue acid suppression daily  -Check stool for H pylori and treat if positive  -Monitor H/H and transfuse as indicated. -Monitor for recurrent overt hemorrhage, consider colonoscopy as inpatient should patient display overt hemorrhage with associated drop in hgb  -My have full liquid diet now     Disposition: Return to medical floor for ongoing care.                  MEDICATIONS:  Protonix 40mg iv bid     IV lr @ 100 cc/hr     Tylenol 650mg po q6hrs prn-had 2 doses

## 2024-04-15 NOTE — PERIOP NOTE
Unable to give  report to receiving RN on the unit. Tried @ 2958 and was on hold for 5 minutes and was  told the RN was busy and I informed them I would call back in 5min. I called  back @ [phone] and was on hold  for 10 minutes  and was RN was unavailable.  Called back @ 9802 to give  report to  charge RN Assistance with ambulation/Assistance OOB with selected safe patient handling equipment/Communicate Risk of Fall with Harm to all staff/Reinforce activity limits and safety measures with patient and family/Tailored Fall Risk Interventions/Visual Cue: Yellow wristband and red socks/Bed in lowest position, wheels locked, appropriate side rails in place/Call bell, personal items and telephone in reach/Instruct patient to call for assistance before getting out of bed or chair/Non-slip footwear when patient is out of bed/[place] to call system/Physically safe environment - no spills, clutter or unnecessary equipment/Purposeful Proactive Rounding/Room/bathroom lighting operational, light cord in reach

## (undated) DEVICE — SYRINGE MED 25GA 3ML L5/8IN SUBQ PLAS W/ DETACH NDL SFTY

## (undated) DEVICE — SYRINGE 20ML LL S/C 50

## (undated) DEVICE — MEDI-VAC NON-CONDUCTIVE SUCTION TUBING: Brand: CARDINAL HEALTH

## (undated) DEVICE — BITE BLOCK ENDOSCP UNIV AD 6 TO 9.4 MM

## (undated) DEVICE — BASIN EMSIS 16OZ GRAPHITE PLAS KID SHP MOLD GRAD FOR ORAL

## (undated) DEVICE — SNARE POLYP M W27MMXL240CM OVL STIFF DISP CAPTIVATOR

## (undated) DEVICE — CANNULA ORIG TL CLR W FOAM CUSHIONS AND 14FT SUPL TB 3 CHN

## (undated) DEVICE — GOWN PLASTIC FILM THMBHKS UNIV BLUE: Brand: CARDINAL HEALTH

## (undated) DEVICE — UNDERPAD INCONT W23XL36IN STD BLU POLYPR BK FLUF SFT

## (undated) DEVICE — SYRINGE MED 10ML LUERLOCK TIP W/O SFTY DISP

## (undated) DEVICE — STERILE POLYISOPRENE POWDER-FREE SURGICAL GLOVES: Brand: PROTEXIS

## (undated) DEVICE — YANKAUER,SMOOTH HANDLE,HIGH CAPACITY: Brand: MEDLINE INDUSTRIES, INC.

## (undated) DEVICE — SYRINGE MED 50ML LUERSLIP TIP

## (undated) DEVICE — LINER SUCT CANSTR 3000CC PLAS SFT PRE ASSEMB W/OUT TBNG W/

## (undated) DEVICE — ENDOSCOPY PUMP TUBING/ CAP SET: Brand: ERBE

## (undated) DEVICE — CANNULA NSL AD TBNG L14FT STD PVC O2 CRV CONN NONFLARED NSL

## (undated) DEVICE — FORCEPS BX L240CM JAW DIA2.8MM L CAP W/ NDL MIC MESH TOOTH

## (undated) DEVICE — FORCEPS BX L240CM JAW DIA2.4MM ORNG L CAP W/ NDL DISP RAD

## (undated) DEVICE — CATHETER SUCT TR FL TIP 14FR W/ O CTRL

## (undated) DEVICE — SOLUTION IRRIG 1000ML H2O STRL BLT

## (undated) DEVICE — GAUZE,SPONGE,4"X4",16PLY,STRL,LF,10/TRAY: Brand: MEDLINE